# Patient Record
Sex: MALE | Race: WHITE | Employment: OTHER | URBAN - METROPOLITAN AREA
[De-identification: names, ages, dates, MRNs, and addresses within clinical notes are randomized per-mention and may not be internally consistent; named-entity substitution may affect disease eponyms.]

---

## 2018-11-27 ENCOUNTER — HOSPITAL ENCOUNTER (OUTPATIENT)
Dept: CARDIAC CATH/INVASIVE PROCEDURES | Age: 73
Discharge: HOME | End: 2018-11-27
Attending: SPECIALIST | Admitting: SPECIALIST
Payer: MEDICARE

## 2018-11-27 VITALS
WEIGHT: 302.69 LBS | DIASTOLIC BLOOD PRESSURE: 74 MMHG | SYSTOLIC BLOOD PRESSURE: 120 MMHG | OXYGEN SATURATION: 94 % | TEMPERATURE: 97.9 F | RESPIRATION RATE: 22 BRPM | BODY MASS INDEX: 37.64 KG/M2 | HEART RATE: 92 BPM | HEIGHT: 75 IN

## 2018-11-27 PROCEDURE — 74011000250 HC RX REV CODE- 250

## 2018-11-27 PROCEDURE — 92960 CARDIOVERSION ELECTRIC EXT: CPT

## 2018-11-27 PROCEDURE — 77030018729 HC ELECTRD DEFIB PAD CARD -B

## 2018-11-27 PROCEDURE — 74011250636 HC RX REV CODE- 250/636: Performed by: SPECIALIST

## 2018-11-27 RX ORDER — SODIUM CHLORIDE 9 MG/ML
75 INJECTION, SOLUTION INTRAVENOUS CONTINUOUS
Status: DISCONTINUED | OUTPATIENT
Start: 2018-11-27 | End: 2018-11-27 | Stop reason: HOSPADM

## 2018-11-27 RX ORDER — TAMSULOSIN HYDROCHLORIDE 0.4 MG/1
0.4 CAPSULE ORAL DAILY
COMMUNITY

## 2018-11-27 RX ORDER — CYCLOBENZAPRINE HCL 10 MG
10 TABLET ORAL
COMMUNITY
End: 2022-10-24

## 2018-11-27 RX ORDER — GABAPENTIN 300 MG/1
300 CAPSULE ORAL 3 TIMES DAILY
COMMUNITY
End: 2022-10-24

## 2018-11-27 RX ORDER — DILTIAZEM HYDROCHLORIDE 240 MG/1
240 CAPSULE, COATED, EXTENDED RELEASE ORAL DAILY
COMMUNITY
End: 2022-10-24

## 2018-11-27 RX ADMIN — SODIUM CHLORIDE 75 ML/HR: 900 INJECTION, SOLUTION INTRAVENOUS at 11:32

## 2018-11-27 RX ADMIN — METHOHEXITAL SODIUM 82.38 MG: 500 INJECTION, POWDER, LYOPHILIZED, FOR SOLUTION INTRAMUSCULAR; INTRAVENOUS; RECTAL at 13:54

## 2018-11-27 NOTE — DISCHARGE INSTRUCTIONS
Electrical Cardioversion: What to Expect at Home  Your Recovery    Electrical cardioversion is a treatment for an abnormal heartbeat, such as atrial fibrillation, supraventricular tachycardia, or ventricular tachycardia (VT). It uses a brief electrical shock to reset your heart's rhythm. After cardioversion, you may have redness, like a sunburn, where the patches were. The medicines you got to make you sleepy may make you feel drowsy for the rest of the day. Your doctor may have you take medicines to help the heart beat normally and to prevent blood clots. This care sheet gives you a general idea about how long it will take for you to recover. But each person recovers at a different pace. Follow the steps below to feel better as quickly as possible. How can you care for yourself at home? Medicines    · Be safe with medicines. Take your medicines exactly as prescribed. Call your doctor if you think you are having a problem with your medicine. You may take one or more of the following medicines:  ? Rate-control medicines to slow the heart rate. These include beta-blockers, calcium channel blockers, and digoxin. ? Rhythm control medicines that help the heart keep a normal rhythm. ? Blood thinners, also called anticoagulants, which help prevent blood clots. You will get more details on the specific medicines your doctor prescribes. Be sure you know how to take your medicines safely.     · Do not take any vitamins, over-the-counter medicines, or herbal products without talking to your doctor first.   Exercise    · Start light exercise if your doctor says that it is okay. Even a small amount will help you get stronger, have more energy, and manage your stress. Walking is an easy way to get exercise. Start out by walking a little more than you did in the hospital. Bit by bit, increase the amount you walk.     · When you exercise, watch for signs that your heart is working too hard.  You are pushing too hard if you cannot talk while you are exercising. If you become short of breath or dizzy or have chest pain, sit down and rest right away.     · Check your pulse regularly. Place two fingers on the artery at the palm side of your wrist in line with your thumb. If your heartbeat seems uneven or fast, talk to your doctor. Other instructions    · Ask your doctor when you can drive again.     · Do not smoke. If you need help quitting, talk to your doctor about stop-smoking programs and medicines. These can increase your chances of quitting for good.     · Limit alcohol. Follow-up care is a key part of your treatment and safety. Be sure to make and go to all appointments, and call your doctor if you are having problems. It's also a good idea to know your test results and keep a list of the medicines you take. When should you call for help? Call 911 anytime you think you may need emergency care. For example, call if:    · You passed out (lost consciousness).     · You have chest pain or pressure. This may occur with:  ? Sweating. ? Shortness of breath. ? Nausea or vomiting. ? Pain that spreads from the chest to the neck, jaw, or one or both shoulders or arms. ? A fast or uneven pulse. After calling 911, the  may tell you to chew 1 adult-strength or 2 to 4 low-dose aspirin. Wait for an ambulance. Do not try to drive yourself.     · You have symptoms of a stroke. These may include:  ? Sudden numbness, tingling, weakness, or loss of movement in your face, arm, or leg, especially on only one side of your body. ? Sudden vision changes. ? Sudden trouble speaking. ? Sudden confusion or trouble understanding simple statements. ? Sudden problems with walking or balance.   ? A sudden, severe headache that is different from past headaches.    Call your doctor now or seek immediate medical care if:    · You feel dizzy or lightheaded, or you feel like you may faint.     · You have a fast or irregular heartbeat.    Watch closely for any changes in your health, and be sure to contact your doctor if you have any problems. Where can you learn more? Go to http://prabha-bernard.info/. Enter A617 in the search box to learn more about \"Electrical Cardioversion: What to Expect at Home. \"  Current as of: December 6, 2017  Content Version: 11.8  © 0349-9428 [employer]. Care instructions adapted under license by [employer] (which disclaims liability or warranty for this information). If you have questions about a medical condition or this instruction, always ask your healthcare professional. [place] [address] any warranty or liability for your use of this information. Discharge Instructions:     Medications: Activity:     As tolerated. Diet:     American Heart Association. Follow-up:     Follow up with Sheryle Gitelman, MD on Decemebr 4th, 2018 at 9:30am.  [facility]  [facility]  [phone]      If you smoke, STOP!

## 2018-11-27 NOTE — PROGRESS NOTES
Patient arrived. ID and allergies verified verbally with patient. Pt voices understanding of procedure to be performed. Consent obtained. Pt prepped for procedure. 1:49 PM  TRANSFER - OUT REPORT:    Verbal report given to Kathy RN(name) on Nona Kamara  being transferred to Cath lab(unit) for ordered procedure       Report consisted of patients Situation, Background, Assessment and   Recommendations(SBAR). Information from the following report(s) SBAR was reviewed with the receiving nurse. Lines:   Peripheral IV 11/27/18 Anterior;Right Arm (Active)        Opportunity for questions and clarification was provided. Patient transported with:   Registered Nurse    2:19 PM  TRANSFER - IN REPORT:    Verbal report received from Omer Hidalgo RN(name) on Nona Kamara  being received from Cath Lab(unit) for routine post - op      Report consisted of patients Situation, Background, Assessment and   Recommendations(SBAR). Information from the following report(s) SBAR, Procedure Summary and Cardiac Rhythm Afib was reviewed with the receiving nurse. Opportunity for questions and clarification was provided. Assessment completed upon patients arrival to unit and care assumed. 2:59 PM  Family at bedside. Discharge instructions reviewed with patient and family. Voiced understanding. Patient given copy of discharge instructions to take home. 3:16 PM  Pt discharged via wheelchair with patient tech. Discharged with family. Personal belongings with patient upon discharge. No complaints.

## 2018-11-27 NOTE — H&P
Date of Surgery Update:  Sophie Barreto was seen and examined. History and physical has been reviewed. The patient has been examined. There have been no significant clinical changes since the completion of the originally dated History and Physical.    Signed By: Evy Terrazas MD     November 27, 2018 12:12 PM         Petra Castle 1945   Office/Outpatient Visit  Visit Date: Mon, Oct 29, 2018 10:20 am  Provider: Evy Terrazas MD (Assistant: Chad Cruz, [place])  Location: Cardiology of [facility] AT [address] [place]. [address] [phone]    Electronically signed by Evy Terrazas MD on  10/29/2018 11:27:58 AM                           SUBJECTIVE:    CC:   Mr. Chaparrita Lazo is a 68year old White male. He does not have a primary care physician. This is a new to our office/seen in hospital follow-up visit. He is here today following a transition of care from the inpatient hospital setting. He was admitted to [facility] on 10/19/2018 and discharged on 10/20/2018 admitted with PE and a-fin with RVR. Medication bottles reviewed. The primary reason for today's visit is evaluation of the following: atrial fibrillation and pulmonary embolism. HPI:     Current related medications include a calcium channel blocker for rate control and Eliquis. He is compliant with his medication regimen. Current level of activity includes ability to walk. He has not been aware of any disturbance in heart rhythm; he specifically denies an irregular heartbeat, brief episodes of tachycardia, prolonged episodes of tachycardia, chest pain, shortness of breath, orthopnea and pedal edema. A transthoracic ECHO has been done ( performed 10/20/2018 ). Pulmonary embolism and infarction, other noted. ROS:   Discussed relevant lab and imaging studies along with the plan of treatment with the nurse. EYES:  Negative for blurred vision and eye pain.     E/N/T:  Negative for epistaxis and hoarseness. CARDIOVASCULAR:  Negative for chest pain, orthopnea, palpitations and pedal edema. RESPIRATORY:  Negative for cough and hemoptysis. GASTROINTESTINAL:  Negative for abdominal pain and dysphagia. MUSCULOSKELETAL:  Negative for arthralgias and back pain. NEUROLOGICAL:  Negative for headaches, paresthesias, and weakness. HEMATOLOGIC/LYMPHATIC:  Negative for easy bruising, bleeding, and lymphadenopathy. PSYCHIATRIC:  No symptoms reported. PMH/FMH/SH:   Last Reviewed on 10/26/2018 12:04 PM by Cynthia Frankel LPN  Past Medical History:     Atrial Fibrillation   Pulmonary Embolism   INFLUENZA VACCINE: was last done    PNEUMOCOCCAL VACCINE: was last done      Past Cardiac Procedures/Tests:  TESTING ( Dr Jessie Morales)     1. ATRIAL FIBRILLATION  a. Echo (10/20/18): EF 55-60%. Dil LA. 2. PULMONARY EMBOLISM   a. CTA (10/19/18): Mild embolic burden. ADVANCED DIRECTIVES: None     Surgical History:   Surgical/Procedural History:   Joint Replacement:  Hip Replacement: bilateral;   Knee Surgery; x2 on each knee     Family History:   Father:  at age 80  ; Positve for Natural causes; Mother: Living  ; Positive for Dementia; Social History:   Social History:   Occupation: Retired   Marital Status:    Children: 1 child and 2 step-children     Tobacco/Alcohol/Supplements:   Last Reviewed on 10/26/2018 12:04 PM by Sanjuana Frankel LPN  TOBACCO/ALCOHOL/SUPPLEMENTS   Tobacco: Past history of cigarette smoking, but has quit. Alcohol: Drinks alcohol very infrequently. Caffeine:  He admits to consuming caffeine via coffee ( 2-3 servings per day ) and tea. Substance Abuse History:   Last Reviewed on 10/26/2018 12:04 PM by Sanjuana Frankel LPN  Substance Use/Abuse:   None     Mental Health History:   Last Reviewed on 10/26/2018 12:04 PM by Pau Palacios LPN, [address] (eg STDs):    Last Reviewed on 10/26/2018 12:04 PM by Sanjuana Frankel LPN      Current Problems:   Last Reviewed on 10/26/2018 12:04 PM by Sanjuana De La Torre LPN  Atrial fibrillation   Pulmonary embolism and infarction, other     Allergies:   Last Reviewed on 10/26/2018 12:04 PM by Sanjuana De La Torre LPN    No Known Drug Allergies. Current Medications:   Last Reviewed on 10/29/2018 10:59 AM by Madison Lynch CD  240mg Capsules, Extended Release 1 po qd   Eliquis 5mg Tablet 2 po bid   Flomax 0.4mg Capsules 1 po qd   Gabapentin 300mg Tablet as directed   Tramadol 50mg Tablet prn as directed     OBJECTIVE:    Vitals:     Current: 10/29/2018 11:00:09 AM  Ht:  6 ft, 3 in; Wt: 296 lbs;  BMI: 37.0  BP: 108/79 mm Hg (left arm, sitting);  P: 71 bpm;  sCr: 0.86 mg/dL;  GFR: 98.27    Exams:   GENERAL:  Alert, oriented to person, place and time x3. EYES:  Normal lids without xanthelasma; conjunctiva unremarkable; no scleral icterus. HEENT:  Face symmetric, voice clear, extraocular muscles intact. NECK:  Supple. No bruits, No JVD. LUNGS:  Clear to auscultation. No rales, no wheezing. CARDIAC: S1 and S2 normal. No murmur , gallops or rubs. No S3 or S4. Irregular rhythm. ABDOMEN:  Soft. Positive bowel sounds. Non-tender. MUSCULOSKELETAL:  Normal range of motion, strength and tone. EXTREMITIES:  No edema. Good muscle tone and strength. SKIN: No significant rashes or lesions; no suspicious moles. NEUROLOGICAL:  No focal deficits, cranial nerves II-XII are grossly intact. PSYCHIATRIC:  Appropriate affect and demeanor; normal speech pattern; grossly normal memory. Lab/Test Results:       Sodium, Serum:  140 (10/20/2018),   Potassium, Serum:  3.7 (10/20/2018),   Glucose Serum:  118 (10/20/2018),   BUN serum/plasma ([facility]):  23 (10/20/2018),   Creatinine, Serum:  0.86 (10/20/2018),   WBC count:  7.97 (10/20/2018),   Hgb:  14.6 (10/20/2018),   Hct:  44.3 (10/20/2018),   Platelets:  919 (71/97/6573),       Procedures:   Atrial fibrillation     ECG INTERPRETATION: See scanned EKG for results.     AF @ 102       ASSESSMENT     Doing well from a cardiac standpoint. Lengthy discussion regarding AF, CVA prophylaxis, rhythm control, rate-control, antiarrhythmics, ablation. DCCV in 3 week. Continuing eliquis for 6 months total for PE. Discussed diet/exercise. 427.31   I48.91  Atrial fibrillation            DDx:   415.19   I26.99  Pulmonary embolism and infarction, other            DDx:     ORDERS:     Procedures Ordered:     [id number]  Electrocardiogram, routine with at least 12 leads; with interpretation and report  (In-House)       Other Orders:     [id number]  [Not available]  (Send-Out)       FLORESITA  CHADS  (Send-Out)       [id number]  Queried Patient for Tobacco Use  (Send-Out)           PLAN:      Atrial fibrillation     *  Plan of care for atrial fibrillation: Follow up visit [id number] score remains greater than 1. He is tolerating rate and rhythm control with prescribed medications. The patient remains anticoagulated with Eliquis. Patient continues to tolerate anticoagulation therapy. .      Medication list has been reviewed. Continue current medications. Smoking Status:  Nonsmoker     Testing/Procedures:  Cardioversion - to be done in one month at [address] [place] [address] to call immediately if he develops new or worsening symptoms, such as shortness of breath and chest pain. Schedule follow-up appointments on a p.r.n. basis.  Schedule a follow-up appointment after testing is complete

## 2018-11-27 NOTE — PROCEDURES
Indication:  Atrial fibrillation. Procedure:  Informed consent was obtained. The patient was anesthetized with 0.6mg/kg brevital.  The patient was converted from atrial fibrillation to normal sinus rhythm with a single biphasic shock of 360 joules. The patient tolerated the procedure well without obvious complications and was transferred to the holding room in stable condition. Summary:  Successful DCCV of atrial fibrillation to NSR, though this quickly reverted back to AF    F/U with me on 12/4/18.

## 2020-03-24 ENCOUNTER — HOSPITAL ENCOUNTER (OUTPATIENT)
Dept: GENERAL RADIOLOGY | Age: 75
Discharge: HOME | End: 2020-03-24
Attending: OTOLARYNGOLOGY
Payer: MEDICARE

## 2020-03-24 DIAGNOSIS — Z00.8 OTHER SPECIFIED GENERAL MEDICAL EXAMINATION: ICD-10-CM

## 2020-03-24 DIAGNOSIS — R13.12 OROPHARYNGEAL DYSPHAGIA: ICD-10-CM

## 2020-03-24 PROCEDURE — 74230 X-RAY XM SWLNG FUNCJ C+: CPT

## 2020-03-24 PROCEDURE — 92611 MOTION FLUOROSCOPY/SWALLOW: CPT

## 2020-03-24 NOTE — PROGRESS NOTES
[address], [address] STUDY  Patient: Khadra Cohen (30 y.o. male)  Date: 3/24/2020  Referring Provider:  Messi MCKEON    SUBJECTIVE:   Patient c/o dysphagia since ACDF in Nov 2019. [place] He has constant globus sensation. He c/o regurgiation of pills at times. Increased voice raspiness. NO PNA. OBJECTIVE:   Past Medical History:   No past medical history on file. No past surgical history on file. Current Dietary Status:  Regular, thins  Radiologist: Rosalia  Film Views: Lateral  Patient Position: upright  standing    Trial 1: Trial 2:   Consistency Presented: Thin liquid;Puree;Pill/Tablet; Solid     How Presented: Spoon;Self-fed/presented;Straw      ORAL PHASE:      Bolus Acceptance: No impairment     Bolus Formation/Control: No impairment:    :     Propulsion: No impairment     Oral Residue: None   PHARYNGEAL PHASE:      Initiation of Swallow: No impairment     Timing: No impairment     Penetration: None     Aspiration/Timing: No evidence of aspiration     Pharyngeal Clearance: Vallecular residue; Less than 10%(with solids and purees)     Attempted Modifications: Alternate liquids/solids     Effective Modifications: Alternate liquids/solids(usually reduced to minimal amounts)     Cues for Modifications: None             Trial 3: Trial 4:    ESOPHAGEAL PHASE:   Radiologist scrolled throught [facility]. Pill was stuck in lower esophagus for a few minutes. It eventually cleared with massive amounts of thins. Noted to and fro in lower esophagus.                          :    :                                                                        Decreased Tongue Base Retraction?: Yes  Laryngeal Elevation: Reduced excursion with laryngeal vestibule gap(mild)  Aspiration/Penetration Score: 1 (No penetration or aspiration-Contrast does not enter the airway)                     ASSESSMENT :  Based on the objective data described above, the patient presents with mild pharyngeal dysphagia with mild upper pharyngeal residue due to BOT retraction weakness and mild epiglottal inversion. Unable to r/o reflux with some to and fro noted in esophagus  With small amounts of liquids, c/o globus sensation. Provided pharyngeal strengthening and BOT retraction exercises (written handout)  . PLAN/RECOMMENDATIONS :  Continue diet as tolerated. Practice swallowing exercises. Watch tongue for white coating that may indicate candidiasis and indicate need for tx. Talk to MD about reflux tx. COMMUNICATION/EDUCATION:   The above findings and recommendations were discussed with: patient who verbalized understanding.     Thank you for this referral.  Melonie Leahy, SLP  Time Calculation: 20 mins

## 2022-10-23 PROBLEM — Z79.01 ANTICOAGULATION ADEQUATE: Status: ACTIVE | Noted: 2022-10-23

## 2022-10-24 ENCOUNTER — OFFICE VISIT (OUTPATIENT)
Dept: CARDIOLOGY CLINIC | Age: 77
End: 2022-10-24
Payer: MEDICARE

## 2022-10-24 VITALS
WEIGHT: 303.2 LBS | RESPIRATION RATE: 16 BRPM | BODY MASS INDEX: 37.7 KG/M2 | DIASTOLIC BLOOD PRESSURE: 82 MMHG | HEIGHT: 75 IN | SYSTOLIC BLOOD PRESSURE: 128 MMHG | OXYGEN SATURATION: 94 % | HEART RATE: 72 BPM

## 2022-10-24 DIAGNOSIS — I48.11 LONGSTANDING PERSISTENT ATRIAL FIBRILLATION (HCC): Primary | ICD-10-CM

## 2022-10-24 DIAGNOSIS — I11.0: ICD-10-CM

## 2022-10-24 DIAGNOSIS — Z79.01 ANTICOAGULATION ADEQUATE: ICD-10-CM

## 2022-10-24 DIAGNOSIS — G47.33 OSA ON CPAP: ICD-10-CM

## 2022-10-24 DIAGNOSIS — Z99.89 OSA ON CPAP: ICD-10-CM

## 2022-10-24 DIAGNOSIS — E66.01 SEVERE OBESITY (BMI 35.0-35.9 WITH COMORBIDITY) (HCC): ICD-10-CM

## 2022-10-24 PROCEDURE — 99204 OFFICE O/P NEW MOD 45 MIN: CPT | Performed by: INTERNAL MEDICINE

## 2022-10-24 PROCEDURE — 93010 ELECTROCARDIOGRAM REPORT: CPT | Performed by: INTERNAL MEDICINE

## 2022-10-24 PROCEDURE — 93005 ELECTROCARDIOGRAM TRACING: CPT | Performed by: INTERNAL MEDICINE

## 2022-10-24 PROCEDURE — 1123F ACP DISCUSS/DSCN MKR DOCD: CPT | Performed by: INTERNAL MEDICINE

## 2022-10-24 PROCEDURE — G0463 HOSPITAL OUTPT CLINIC VISIT: HCPCS | Performed by: INTERNAL MEDICINE

## 2022-10-24 RX ORDER — METOPROLOL SUCCINATE 50 MG/1
50 TABLET, EXTENDED RELEASE ORAL DAILY
COMMUNITY
Start: 2022-10-06

## 2022-10-24 RX ORDER — FINASTERIDE 5 MG/1
TABLET, FILM COATED ORAL
COMMUNITY
Start: 2022-10-09

## 2022-10-24 RX ORDER — ATORVASTATIN CALCIUM 20 MG/1
20 TABLET, FILM COATED ORAL DAILY
COMMUNITY
Start: 2022-09-05

## 2022-10-24 RX ORDER — VITAMIN E 1000 UNIT
1000 CAPSULE ORAL DAILY
COMMUNITY

## 2022-10-24 NOTE — PROGRESS NOTES
Room EP 3    Chief Complaint   Patient presents with    Irregular Heart Beat       Visit Vitals  /82 (BP 1 Location: Left upper arm, BP Patient Position: Sitting, BP Cuff Size: Large adult)   Pulse 72   Resp 16   Ht 6' 3\" (1.905 m)   Wt 303 lb 3.2 oz (137.5 kg)   SpO2 94%   BMI 37.90 kg/m²       EKG done today    Chest pain: no    Shortness of breath: no    Edema:left lower leg, wears compression hose    Palpitations, Skipped beats, Rapid heartbeat: no    Dizziness: no    Fatigue:no    New diagnosis/Surgeries: no    1. Have you been to the ER, urgent care clinic since your last visit? Hospitalized since your last visit? No      2. Have you seen or consulted any other health care providers outside of the [address] [place] [address] since your last visit? Include any pap smears or colon screening.  [facility], records available    Refills:no

## 2022-10-24 NOTE — PROGRESS NOTES
Cardiac Electrophysiology OFFICE Consultation Note       Assessment/Plan:   1. Longstanding persistent atrial fibrillation (HCC)  -     AMB POC EKG ROUTINE W/ 12 LEADS, INTER & REP  2. Anticoagulation adequate  3. Severe obesity (BMI 35.0-35.9 with comorbidity) ([address]  4. ISADORA on CPAP  5. 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 Status post PVI, CTI, posterior wall isolation ablation 11/22/2021. Has done well status post ablation without any recurrent atrial fibrillation or palpitations. Remains in sinus rhythm today on EKG.  - I'm pleased to hear how well the patient has done post ablation. We spoke in great detail regarding the importance of multidisciplinary management of AFib and the role of risk factors modification in preventing recurrent AF including focusing on diet, weight loss, control of blood pressure, glycemic control, ISADORA diagnosis and treatment, and medication compliance. -CPAP compliance  -Weight loss goal ~30 lbs  -BP goal <140/90 mmHg  -Obtain 1 week event monitor in 1 month  -Continue metoprolol for management of blood pressure  - Continue Eliquis for thromboembolic prophylaxis  -FU 3 months with NP  -FU 6 months with Dr. Diaz Smoker    Anticoagulation  Denies of any bleeding issues. Know to contact clinic with any bleeding side effects (BRBPR, melena, hemotysis, hematuria). Severe obesity  I have discussed with the patient the need to exercise and lose weight. Encouraged increased physical activity as able and reduced caloric intake. ISADORA  History of ISADORA on CPAP. We spoke regarding the associated between ISADORA and its role in AFib pathophysiology. I emphasized the importance of ISADORA diagnosis and the need CPAP compliance.     Hypertension with heart failure preserved ejection fraction  Volume status euvolemic  - Blood pressure well controlled continue metoprolol    Subjective:       Nilda Diallo is a 69 y.o. patient who is seen for evaluation of atrial fibrillation. He underwent A. fib ablation in November. Has done well despite he was previously in longstanding persistent atrial fibrillation. Remains in normal sinus rhythm. Has not had any recurrent hospitalization. No palpitations, shortness of breath or chest pain. 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 atorvastatin (LIPITOR) 20 mg tablet Take 20 mg by mouth daily. ascorbic acid, vitamin C, (Vitamin C) 1,000 mg tablet Take 1,000 mg by mouth daily. rutin/hesp/bioflav/C/hcdayy006 (BIOFLEX PO) Take 1 Tablet by mouth two (2) times a day. tamsulosin (FLOMAX) 0.4 mg capsule Take 0.4 mg by mouth daily. apixaban (ELIQUIS) 5 mg tablet Take 5 mg by mouth two (2) times a day. No Known Allergies  History reviewed. No pertinent past medical history. History reviewed. No pertinent surgical history. History reviewed. No pertinent family history. Social History     Tobacco Use    Smoking status: Former     Packs/day: 0.50     Years: 4.00     Pack years: 2.00     Types: Cigarettes     Quit date:      Years since quittin.8    Smokeless tobacco: Current    Tobacco comments:     Dips snuff   Substance Use Topics    Alcohol use: Yes     Comment: rarely        Review of Systems:   12 point review of systems was performed.  All negative except for HPI     Objective: /82 (BP 1 Location: Left upper arm, BP Patient Position: Sitting, BP Cuff Size: Large adult)   Pulse 72   Resp 16   Ht 6' 3\" (1.905 m)   Wt 303 lb 3.2 oz (137.5 kg)   SpO2 94%   BMI 37.90 kg/m²     Physical Exam:   General:  Alert and oriented, in no acute distress  Head:  Atraumatic, normocephalic  Eyes:  extraocular muscles intact  Neck:  Supple, normal range of motion  Lungs:  Clear to auscultation bilaterally, no wheezes/rales/rhonchi   Cardiovascular:  Regular rate and rhythm, normal S1-S2, no murmurs/rubs/gallops  Abdomen:  Soft, nontender, nondistended, normoactive bowel sounds  Skin:  Intact, no rash  Extremities:, no clubbing, cyanosis, or edema  Musculoskeletal: normal range of motion  Neurological:  Alert and oriented, no focal neurologic deficits  Psychiatric:  Normal mood and affect    No results found for: HBA1C, ZNW7SFAI, TPE7BLND  EKG: Sinus rhythm heart rate in the 60s        No results found for this or any previous visit. Thank you for involving me in this patient's care and please call with further concerns or questions. ________________________________________  An Muriel Lundy MD, [facility], [facility]  Cardiac Electrophysiology  [facility] [place]  [address] [place] [address]                             [phone]     [address] [place] [address].  [address] [place] [address]  [phone]

## 2022-10-24 NOTE — PATIENT INSTRUCTIONS
It's great to see you remain in sinus rhythm  CPAP compliance  Weight loss goal ~30 lbs  BP goal <140/90 mmHg  Obtain 1 week event monitor in 1 month  FU 3 months with NP  FU 6 months with Dr. Arti Guevara

## 2022-12-02 ENCOUNTER — TELEPHONE (OUTPATIENT)
Dept: CARDIOLOGY CLINIC | Age: 77
End: 2022-12-02

## 2022-12-02 NOTE — TELEPHONE ENCOUNTER
Enrolled with [employer] - Ordered and being shipped to patient's home address on file. ETA within 5-7 business days.     Eli Adrian MD   Physician   Specialty:  Cardiovascular Disease Physician   Patient Instructions   Signed   Encounter Date:  10/24/2022                         It's great to see you remain in sinus rhythm  CPAP compliance  Weight loss goal ~30 lbs  BP goal <140/90 mmHg  Obtain 1 week event monitor in 1 month  FU 3 months with NP  FU 6 months with Dr. Kyle Pittman

## 2022-12-27 PROBLEM — Z86.79 S/P ABLATION OF ATRIAL FIBRILLATION: Status: ACTIVE | Noted: 2022-12-27

## 2022-12-27 PROBLEM — Z98.890 S/P ABLATION OF ATRIAL FIBRILLATION: Status: ACTIVE | Noted: 2022-12-27

## 2022-12-27 NOTE — PROGRESS NOTES
Cardiac Electrophysiology OFFICE Follow Up Note       Assessment/Plan:   1. Longstanding persistent atrial fibrillation ([address]  2. S/P ablation of atrial fibrillation  3. Anticoagulation adequate  4. ISADORA on CPAP     Longstanding persistent atrial fibrillation  Symptomatic with history of heart failure. Status post PVI, CTI, posterior wall isolation ablation 11/22/2021 with Dr. Sandip Gupta.   -Weight loss goal ~30 lbs  -BP goal <140/90 mmHg  - One week monitor December 2022 showed no AF  -Continue metoprolol for management of blood pressure  - Continue Eliquis for thromboembolic prophylaxis  -FU 3 months with Dr. Sandip Gupta    Anticoagulation  - Denies of any bleeding issues. Know to contact clinic with any bleeding side effects (BRBPR, melena, hemotysis, hematuria). - Continue Eliquis    Severe obesity  I have discussed with the patient the need to exercise and lose weight. Encouraged increased physical activity as able and reduced caloric intake. ISADORA  History of ISADORA on CPAP. We spoke regarding the associated between ISADORA and its role in AFib pathophysiology. I emphasized the importance of ISADORA diagnosis and the need CPAP compliance. Hypertension with heart failure preserved ejection fraction  Volume status euvolemic  - Blood pressure well controlled. continue metoprolol    Subjective:       Rina Dumotn is a 69 y.o. patient who is seen for follow up of atrial fibrillation. He underwent A. fib ablation in November 2021. Has done well despite he was previously in longstanding persistent atrial fibrillation. Remains in normal sinus rhythm. No AF on recent one week monitor. He denies chest pain, dyspnea, dizziness or syncope.      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 finasteride (PROSCAR) 5 mg tablet TAKE 1 TABLET BY MOUTH ONCE DAILY AT NIGHT      metoprolol succinate (TOPROL-XL) 50 mg XL tablet Take 50 mg by mouth daily. atorvastatin (LIPITOR) 20 mg tablet Take 20 mg by mouth daily. ascorbic acid, vitamin C, (VITAMIN C) 1,000 mg tablet Take 1,000 mg by mouth daily. rutin/hesp/bioflav/C/vvvqak046 (BIOFLEX PO) Take 1 Tablet by mouth two (2) times a day. tamsulosin (FLOMAX) 0.4 mg capsule Take 0.4 mg by mouth daily. apixaban (ELIQUIS) 5 mg tablet Take 5 mg by mouth two (2) times a day. No Known Allergies  History reviewed. No pertinent past medical history. History reviewed. No pertinent surgical history. History reviewed. No pertinent family history. Social History     Tobacco Use    Smoking status: Former     Packs/day: 0.50     Years: 4.00     Pack years: 2.00     Types: Cigarettes     Quit date: 1970     Years since quittin.1    Smokeless tobacco: Current    Tobacco comments:     Dips snuff   Substance Use Topics    Alcohol use: Yes     Comment: rarely        Review of Systems:   12 point review of systems was performed.  All negative except for HPI     Objective:   /68 (BP 1 Location: Left upper arm, BP Patient Position: Sitting, BP Cuff Size: Large adult)   Pulse 72   Resp 16   Ht 6' 3\" (1.905 m)   Wt 138.6 kg (305 lb 9.6 oz)   SpO2 96%   BMI 38.20 kg/m²     Physical Exam:   General:  Alert and oriented, in no acute distress; obese  Head:  Atraumatic, normocephalic  Eyes:  extraocular muscles intact  Neck:  Supple, normal range of motion  Lungs:  Clear to auscultation bilaterally, no wheezes/rales/rhonchi   Cardiovascular:  Regular rate and rhythm, normal S1-S2, no murmurs/rubs/gallops  Abdomen:  Soft, nontender, nondistended, normoactive bowel sounds  Skin:  Intact, no rash  Extremities:, no clubbing, cyanosis, or edema  Musculoskeletal: normal range of motion  Neurological:  Alert and oriented, no focal neurologic deficits  Psychiatric:  Normal mood and affect    No results found for: HBA1C, QQW0QMZA, MAP5MINI, PFR2XLLT  EKG 10/24/22: Sinus rhythm heart rate in the 60s        Thank you for involving me in this patient's care and please call with further concerns or questions. ________________________________________  Emily Erwin NP    Cardiac Electrophysiology  [facility]   [address] [facility].  [address] [facility], [address] [place] [address] [phone]

## 2023-02-03 ENCOUNTER — OFFICE VISIT (OUTPATIENT)
Dept: CARDIOLOGY CLINIC | Age: 78
End: 2023-02-03
Payer: MEDICARE

## 2023-02-03 VITALS
BODY MASS INDEX: 38 KG/M2 | SYSTOLIC BLOOD PRESSURE: 122 MMHG | OXYGEN SATURATION: 96 % | HEIGHT: 75 IN | DIASTOLIC BLOOD PRESSURE: 68 MMHG | RESPIRATION RATE: 16 BRPM | WEIGHT: 305.6 LBS | HEART RATE: 72 BPM

## 2023-02-03 DIAGNOSIS — Z79.01 ANTICOAGULATION ADEQUATE: ICD-10-CM

## 2023-02-03 DIAGNOSIS — Z98.890 S/P ABLATION OF ATRIAL FIBRILLATION: ICD-10-CM

## 2023-02-03 DIAGNOSIS — G47.33 OSA ON CPAP: ICD-10-CM

## 2023-02-03 DIAGNOSIS — Z99.89 OSA ON CPAP: ICD-10-CM

## 2023-02-03 DIAGNOSIS — I48.11 LONGSTANDING PERSISTENT ATRIAL FIBRILLATION (HCC): Primary | ICD-10-CM

## 2023-02-03 DIAGNOSIS — Z86.79 S/P ABLATION OF ATRIAL FIBRILLATION: ICD-10-CM

## 2023-02-03 RX ORDER — TADALAFIL 5 MG/1
5 TABLET ORAL
COMMUNITY

## 2023-02-03 NOTE — PROGRESS NOTES
Room EP 3    Chief Complaint   Patient presents with    Irregular Heart Beat     Visit Vitals  /68 (BP 1 Location: Left upper arm, BP Patient Position: Sitting, BP Cuff Size: Large adult)   Pulse 72   Resp 16   Ht 6' 3\" (1.905 m)   Wt 305 lb 9.6 oz (138.6 kg)   SpO2 96%   BMI 38.20 kg/m²         Chest pain: brief episodes while wearing the monitor    Shortness of breath: occasionally at rest and with activity    Edema: yes ,wears compression socks    Palpitations, Skipped beats, Rapid heartbeat: no    Dizziness: occasional, randomly while cooking    Fatigue:no    New diagnosis/Surgeries: no    1. Have you been to the ER, urgent care clinic since your last visit? Hospitalized since your last visit? NO      2. Have you seen or consulted any other health care providers outside of the [address] [place] [address] since your last visit? Include any pap smears or colon screening.  Labs with PCP    Refills:NO

## 2023-02-07 ENCOUNTER — HOSPITAL ENCOUNTER (OUTPATIENT)
Age: 78
Setting detail: OBSERVATION
Discharge: HOME | End: 2023-02-08
Attending: EMERGENCY MEDICINE | Admitting: HOSPITALIST
Payer: MEDICARE

## 2023-02-07 ENCOUNTER — APPOINTMENT (OUTPATIENT)
Dept: MRI IMAGING | Age: 78
End: 2023-02-07
Attending: HOSPITALIST
Payer: MEDICARE

## 2023-02-07 ENCOUNTER — APPOINTMENT (OUTPATIENT)
Dept: CT IMAGING | Age: 78
End: 2023-02-07
Attending: EMERGENCY MEDICINE
Payer: MEDICARE

## 2023-02-07 DIAGNOSIS — R27.0 ATAXIA: Primary | ICD-10-CM

## 2023-02-07 DIAGNOSIS — R42 DIZZINESS: ICD-10-CM

## 2023-02-07 PROBLEM — M54.30 SCIATICA: Status: ACTIVE | Noted: 2018-11-15

## 2023-02-07 PROBLEM — N40.0 BPH (BENIGN PROSTATIC HYPERPLASIA): Status: ACTIVE | Noted: 2023-02-07

## 2023-02-07 PROBLEM — M51.36 LUMBAR DEGENERATIVE DISC DISEASE: Status: ACTIVE | Noted: 2018-05-09

## 2023-02-07 PROBLEM — G45.9 TIA (TRANSIENT ISCHEMIC ATTACK): Status: ACTIVE | Noted: 2023-02-07

## 2023-02-07 PROBLEM — M51.369 LUMBAR DEGENERATIVE DISC DISEASE: Status: ACTIVE | Noted: 2018-05-09

## 2023-02-07 PROBLEM — I48.91 ATRIAL FIBRILLATION (HCC): Status: ACTIVE | Noted: 2022-02-16

## 2023-02-07 LAB
ALBUMIN SERPL-MCNC: 3.8 G/DL (ref 3.5–5)
ALBUMIN/GLOB SERPL: 1 (ref 1.1–2.2)
ALP SERPL-CCNC: 73 U/L (ref 45–117)
ALT SERPL-CCNC: 64 U/L (ref 12–78)
ANION GAP SERPL CALC-SCNC: 10 MMOL/L (ref 5–15)
AST SERPL-CCNC: 44 U/L (ref 15–37)
BASOPHILS # BLD: 0 K/UL (ref 0–0.1)
BASOPHILS NFR BLD: 0 % (ref 0–1)
BILIRUB SERPL-MCNC: 0.6 MG/DL (ref 0.2–1)
BUN SERPL-MCNC: 24 MG/DL (ref 6–20)
BUN/CREAT SERPL: 24 (ref 12–20)
CALCIUM SERPL-MCNC: 9.3 MG/DL (ref 8.5–10.1)
CHLORIDE SERPL-SCNC: 108 MMOL/L (ref 97–108)
CO2 SERPL-SCNC: 23 MMOL/L (ref 21–32)
COMMENT, HOLDF: NORMAL
CREAT SERPL-MCNC: 1.01 MG/DL (ref 0.7–1.3)
DIFFERENTIAL METHOD BLD: NORMAL
EOSINOPHIL # BLD: 0 K/UL (ref 0–0.4)
EOSINOPHIL NFR BLD: 0 % (ref 0–7)
ERYTHROCYTE [DISTWIDTH] IN BLOOD BY AUTOMATED COUNT: 12.6 % (ref 11.5–14.5)
GLOBULIN SER CALC-MCNC: 3.8 G/DL (ref 2–4)
GLUCOSE BLD STRIP.AUTO-MCNC: 144 MG/DL (ref 65–117)
GLUCOSE SERPL-MCNC: 167 MG/DL (ref 65–100)
HCT VFR BLD AUTO: 44.2 % (ref 36.6–50.3)
HGB BLD-MCNC: 14.3 G/DL (ref 12.1–17)
IMM GRANULOCYTES # BLD AUTO: 0 K/UL (ref 0–0.04)
IMM GRANULOCYTES NFR BLD AUTO: 0 % (ref 0–0.5)
INR PPP: 1.1 (ref 0.9–1.1)
LYMPHOCYTES # BLD: 1.4 K/UL (ref 0.8–3.5)
LYMPHOCYTES NFR BLD: 19 % (ref 12–49)
MCH RBC QN AUTO: 29.2 PG (ref 26–34)
MCHC RBC AUTO-ENTMCNC: 32.4 G/DL (ref 30–36.5)
MCV RBC AUTO: 90.2 FL (ref 80–99)
MONOCYTES # BLD: 0.5 K/UL (ref 0–1)
MONOCYTES NFR BLD: 6 % (ref 5–13)
NEUTS SEG # BLD: 5.6 K/UL (ref 1.8–8)
NEUTS SEG NFR BLD: 75 % (ref 32–75)
NRBC # BLD: 0 K/UL (ref 0–0.01)
NRBC BLD-RTO: 0 PER 100 WBC
PLATELET # BLD AUTO: 229 K/UL (ref 150–400)
PMV BLD AUTO: 10.1 FL (ref 8.9–12.9)
POTASSIUM SERPL-SCNC: 3.4 MMOL/L (ref 3.5–5.1)
PROT SERPL-MCNC: 7.6 G/DL (ref 6.4–8.2)
PROTHROMBIN TIME: 11.2 SEC (ref 9–11.1)
RBC # BLD AUTO: 4.9 M/UL (ref 4.1–5.7)
SAMPLES BEING HELD,HOLD: NORMAL
SERVICE CMNT-IMP: ABNORMAL
SODIUM SERPL-SCNC: 141 MMOL/L (ref 136–145)
WBC # BLD AUTO: 7.6 K/UL (ref 4.1–11.1)

## 2023-02-07 PROCEDURE — 96374 THER/PROPH/DIAG INJ IV PUSH: CPT

## 2023-02-07 PROCEDURE — 70551 MRI BRAIN STEM W/O DYE: CPT

## 2023-02-07 PROCEDURE — 83036 HEMOGLOBIN GLYCOSYLATED A1C: CPT

## 2023-02-07 PROCEDURE — 0042T CT CODE NEURO PERF W CBF: CPT

## 2023-02-07 PROCEDURE — 99285 EMERGENCY DEPT VISIT HI MDM: CPT

## 2023-02-07 PROCEDURE — 93005 ELECTROCARDIOGRAM TRACING: CPT

## 2023-02-07 PROCEDURE — G0378 HOSPITAL OBSERVATION PER HR: HCPCS

## 2023-02-07 PROCEDURE — 96375 TX/PRO/DX INJ NEW DRUG ADDON: CPT

## 2023-02-07 PROCEDURE — 70496 CT ANGIOGRAPHY HEAD: CPT

## 2023-02-07 PROCEDURE — 85610 PROTHROMBIN TIME: CPT

## 2023-02-07 PROCEDURE — 74011250636 HC RX REV CODE- 250/636: Performed by: STUDENT IN AN ORGANIZED HEALTH CARE EDUCATION/TRAINING PROGRAM

## 2023-02-07 PROCEDURE — 80053 COMPREHEN METABOLIC PANEL: CPT

## 2023-02-07 PROCEDURE — 85025 COMPLETE CBC W/AUTO DIFF WBC: CPT

## 2023-02-07 PROCEDURE — 82962 GLUCOSE BLOOD TEST: CPT

## 2023-02-07 PROCEDURE — 74011250637 HC RX REV CODE- 250/637: Performed by: HOSPITALIST

## 2023-02-07 PROCEDURE — 77010033678 HC OXYGEN DAILY

## 2023-02-07 PROCEDURE — 70450 CT HEAD/BRAIN W/O DYE: CPT

## 2023-02-07 PROCEDURE — 74011250636 HC RX REV CODE- 250/636: Performed by: EMERGENCY MEDICINE

## 2023-02-07 PROCEDURE — 36415 COLL VENOUS BLD VENIPUNCTURE: CPT

## 2023-02-07 PROCEDURE — 94762 N-INVAS EAR/PLS OXIMTRY CONT: CPT

## 2023-02-07 PROCEDURE — 74011000636 HC RX REV CODE- 636: Performed by: EMERGENCY MEDICINE

## 2023-02-07 PROCEDURE — 94761 N-INVAS EAR/PLS OXIMETRY MLT: CPT

## 2023-02-07 RX ORDER — ATORVASTATIN CALCIUM 20 MG/1
20 TABLET, FILM COATED ORAL DAILY
Status: DISCONTINUED | OUTPATIENT
Start: 2023-02-08 | End: 2023-02-08 | Stop reason: HOSPADM

## 2023-02-07 RX ORDER — ASCORBIC ACID 500 MG
1000 TABLET ORAL DAILY
Status: DISCONTINUED | OUTPATIENT
Start: 2023-02-08 | End: 2023-02-08 | Stop reason: HOSPADM

## 2023-02-07 RX ORDER — ACETAMINOPHEN 325 MG/1
650 TABLET ORAL
Status: DISCONTINUED | OUTPATIENT
Start: 2023-02-07 | End: 2023-02-08 | Stop reason: HOSPADM

## 2023-02-07 RX ORDER — METOPROLOL SUCCINATE 50 MG/1
50 TABLET, EXTENDED RELEASE ORAL DAILY
Status: DISCONTINUED | OUTPATIENT
Start: 2023-02-08 | End: 2023-02-08 | Stop reason: HOSPADM

## 2023-02-07 RX ORDER — ACETAMINOPHEN 650 MG/1
650 SUPPOSITORY RECTAL
Status: DISCONTINUED | OUTPATIENT
Start: 2023-02-07 | End: 2023-02-08 | Stop reason: HOSPADM

## 2023-02-07 RX ORDER — TAMSULOSIN HYDROCHLORIDE 0.4 MG/1
0.4 CAPSULE ORAL DAILY
Status: DISCONTINUED | OUTPATIENT
Start: 2023-02-08 | End: 2023-02-08 | Stop reason: HOSPADM

## 2023-02-07 RX ORDER — DIAZEPAM 10 MG/2ML
5 INJECTION INTRAMUSCULAR ONCE
Status: COMPLETED | OUTPATIENT
Start: 2023-02-07 | End: 2023-02-07

## 2023-02-07 RX ORDER — FINASTERIDE 5 MG/1
5 TABLET, FILM COATED ORAL DAILY
Status: DISCONTINUED | OUTPATIENT
Start: 2023-02-07 | End: 2023-02-08 | Stop reason: HOSPADM

## 2023-02-07 RX ORDER — ONDANSETRON 2 MG/ML
4 INJECTION INTRAMUSCULAR; INTRAVENOUS ONCE
Status: COMPLETED | OUTPATIENT
Start: 2023-02-07 | End: 2023-02-07

## 2023-02-07 RX ORDER — GUAIFENESIN 100 MG/5ML
81 LIQUID (ML) ORAL DAILY
Status: DISCONTINUED | OUTPATIENT
Start: 2023-02-08 | End: 2023-02-08 | Stop reason: HOSPADM

## 2023-02-07 RX ADMIN — DIAZEPAM 5 MG: 5 INJECTION, SOLUTION INTRAMUSCULAR; INTRAVENOUS at 13:13

## 2023-02-07 RX ADMIN — ONDANSETRON 4 MG: 2 INJECTION INTRAMUSCULAR; INTRAVENOUS at 12:23

## 2023-02-07 RX ADMIN — APIXABAN 5 MG: 5 TABLET, FILM COATED ORAL at 22:09

## 2023-02-07 RX ADMIN — IOPAMIDOL 140 ML: 755 INJECTION, SOLUTION INTRAVENOUS at 13:26

## 2023-02-07 RX ADMIN — FINASTERIDE 5 MG: 5 TABLET, FILM COATED ORAL at 22:09

## 2023-02-07 NOTE — ED NOTES
Stroke Education provided to patient and the following topics were discussed    1. Patients personal risk factors for stroke are atrial fibrillation    2. Warning signs of Stroke:        * Sudden numbness or weakness of the face, arm or leg, especially on one side of          The body            * Sudden confusion, trouble speaking or understanding        * Sudden trouble seeing in one or both eyes        * Sudden trouble walking, dizziness, loss of balance or coordination        * Sudden severe headache with no known cause      3. Importance of activation Emergency Medical Services ( 9-1-1 ) immediately if experience any warning signs of stroke. 4. Be sure and schedule a follow-up appointment with your primary care doctor or any specialists as instructed. 5. You must take medicine every day to treat your risk factors for stroke. Be sure to take your medicines exactly as your doctor tells you: no more, no less. Know what your medicines are for , what they do. Anti-thrombotics /anticoagulants can help prevent strokes. You are taking the following medicine(s)  Eliquis     6. Smoking and second-hand smoke greatly increase your risk of stroke, cardiovascular disease and death. Smoking history cigarettes, 3 per day or ended year 1970    7. Information provided was Stroke Handouts or Verbal Education    8. Documentation of teaching completed in Patient Education Activity and on Care Plan with teaching response noted?   yes

## 2023-02-07 NOTE — ED NOTES
Signs and symptoms: dizziness and nausea   Code Stroke activation time: 1153  Provider at bedside time:  1153  VAN score: Negative  Last Known Well (Time): 1030 am  Blood Glucose Result/Time:  144 @ 1156  Blood Pressure: 165/95  Anticoagulants (List medications): eliquis

## 2023-02-07 NOTE — ED TRIAGE NOTES
Pt arrives to the ER for complaints of dizziness, chest pain, and nausea that started about a hour ago. Pt reports that he is also having bilateral blurry vision that is intermittent. Denies any numbness or tingling, or changes in speech.      PMH of afib, takes eliquis

## 2023-02-07 NOTE — H&P
[facility]  [address] [facility] [address]  [phone]    Hospitalist Admission Note      NAME:  Lilibeth Etienne   :   1945   MRN:  [id number]     PCP:  Aracelis Watson MD     Date/Time of service:  2023 2:31 PM          Subjective:     CHIEF COMPLAINT: Dizziness    HISTORY OF PRESENT ILLNESS:     Mr. Rosario  is a 69 y.o.  male who presented to the Emergency Department complaining of dizziness. Patient is alert awake oriented x3. As per the patient he woke up at 7:30 AM this morning. He was feeling fine at that time but around 10 AM he was walking to the bathroom when he started feeling dizzy. He denies any weakness or numbness but he was nauseated and had vomiting. He denies any diarrhea constipation. He was also complaining of headache and chest tightness. He does have a history of A-fib and he had a ablation done last year. He is on Eliquis. He had a CT of head and neck done which did not show any large vessel occlusion. His wife is at bedside. Past medical [id number], hypertension, BPH, hyperlipidemia. Past surgical history-cardiac ablation. Family history-grandfather with coronary artery disease. Social History     Tobacco Use    Smoking status: Former     Packs/day: 0.50     Years: 4.00     Pack years: 2.00     Types: Cigarettes     Quit date: 1970     Years since quittin.1    Smokeless tobacco: Current    Tobacco comments:     Dips snuff   Substance Use Topics    Alcohol use: Yes     Comment: rarely        No Known Allergies     Prior to Admission medications    Medication Sig Start Date End Date Taking? Authorizing Provider   tadalafiL (CIALIS) 5 mg tablet Take 5 mg by mouth daily as needed for Erectile Dysfunction.     Provider, Historical   finasteride (PROSCAR) 5 mg tablet TAKE 1 TABLET BY MOUTH ONCE DAILY AT NIGHT 10/9/22   Provider, Historical   metoprolol succinate (TOPROL-XL) 50 mg XL tablet Take 50 mg by mouth daily. 10/6/22   Provider, Historical   atorvastatin (LIPITOR) 20 mg tablet Take 20 mg by mouth daily. 9/5/22   Provider, Historical   ascorbic acid, vitamin C, (VITAMIN C) 1,000 mg tablet Take 1,000 mg by mouth daily. Provider, Historical   rutin/hesp/bioflav/C/eexlkb305 (BIOFLEX PO) Take 1 Tablet by mouth two (2) times a day. Provider, Historical   tamsulosin (FLOMAX) 0.4 mg capsule Take 0.4 mg by mouth daily. Provider, Historical   apixaban (ELIQUIS) 5 mg tablet Take 5 mg by mouth two (2) times a day.     Provider, Historical       Review of Systems:  (bold if positive, if negative)    Gen:  Eyes:  ENT:  CVS:  Pulm:  GI:  :  MS:  Skin:  Psych:  Endo:  Hem:  Renal:  Neuro:        Objective:      VITALS:    Vital signs reviewed; most recent are:    Visit Vitals  /72   Pulse (!) 58   Temp 97.4 °F (36.3 °C)   Resp 15   Ht 6' 3\" (1.905 m)   Wt 138.8 kg (306 lb)   SpO2 96%   BMI 38.25 kg/m²     SpO2 Readings from Last 6 Encounters:   02/07/23 96%   02/03/23 96%   10/24/22 94%   11/27/18 94%    O2 Flow Rate (L/min): 2 l/min   No intake or output data in the 24 hours ending 02/07/23 4565     Exam:     Physical Exam:    Gen:  Well-developed, well-nourished, in no acute distress  HEENT:  Pink conjunctivae, PERRL, hearing intact to voice, moist mucous membranes  Neck:  Supple, without masses, thyroid non-tender  Resp:  No accessory muscle use, clear breath sounds without wheezes rales or rhonchi  Card:  No murmurs, normal S1, S2 without thrills, bruits or peripheral edema  Abd:  Soft, non-tender, non-distended, normoactive bowel sounds are present, no mass  Lymph:  No cervical or inguinal adenopathy  Musc:  No cyanosis or clubbing  Skin:  No rashes or ulcers, skin turgor is good  Neuro:  Cranial nerves are grossly intact, no focal motor weakness, follows commands appropriately  Psych:  Good insight, oriented to person, place and time, alert     Labs:    Recent Labs     02/07/23  1156   WBC 7.6   HGB 14.3   HCT 44.2        Recent Labs     02/07/23  1156      K 3.4*      CO2 23   *   BUN 24*   CREA 1.01   CA 9.3   ALB 3.8   TBILI 0.6   ALT 64     Lab Results   Component Value Date/Time    Glucose (POC) 144 (H) 02/07/2023 11:56 AM     No results for input(s): PH, PCO2, PO2, HCO3, FIO2 in the last 72 hours. Recent Labs     02/07/23  1156   INR 1.1     All Micro Results       None            I have reviewed previous records       Assessment and Plan:      Principal Problem:    TIA (transient ischemic attack) (2/7/2023)    Active Problems:    ISADORA on CPAP (10/24/2022)      S/P ablation of atrial fibrillation (12/27/2022)      BPH (benign prostatic hyperplasia) (2/7/2023)     Plan:    1. TIA-MRI brain, echocardiogram, start aspirin. Neurology consult. 2.  BPH-continue Proscar and Flomax. 3.  History of atrial fibrillation-he is on Toprol-XL and Eliquis. He had an ablation done in the past.    4.  Hyperlipidemia-continue statins. 5.  Obstructive sleep apnea-on CPAP at night. 6.  DVT prophylaxis-SCDs. Patient is also on Eliquis. Patient is a full code. Plan of care discussed with the patient and the family at bedside. Telemetry reviewed:   normal sinus rhythm    Risk of deterioration: medium      Total time spent with patient: 79 Minutes I personally reviewed chart, notes, data and current medications in the medical record. I have personally examined and treated the patient at bedside during this period. To assist coordination of care and communication with nursing and staff, this note may be preliminary early in the day, but finalized by end of the day.                  Care Plan discussed with: Patient and Family    Discussed:  Code Status and Care Plan       ___________________________________________________    Attending Physician: Jair Armando MD

## 2023-02-07 NOTE — ED PROVIDER NOTES
71-year-old male with past medical history of atrial fibrillation status post ablation chronically on Eliquis, ISADORA on CPAP, obesity, lumbar degenerative disc disease who presents with acute onset dizziness approximately 1.5 hours ago. Patient reports he woke up feeling normal this morning, he went to use the bathroom and had sudden onset dizziness with what he describes as room spinning. At that time he also had new onset difficulty with walking. He has never had the symptoms in the past and never been diagnosed with vertigo. He denies weakness in arms or legs, slurred speech, facial droop. Patient does have history of a cervical spinal procedure and describes intermittent dysphagia since that time, he does not report any new changes to his underlying symptoms. No past medical history on file. No past surgical history on file. No family history on file. Social History     Socioeconomic History    Marital status:      Spouse name: Not on file    Number of children: Not on file    Years of education: Not on file    Highest education level: Not on file   Occupational History    Not on file   Tobacco Use    Smoking status: Former     Packs/day: 0.50     Years: 4.00     Pack years: 2.00     Types: Cigarettes     Quit date: 5     Years since quittin.1    Smokeless tobacco: Current    Tobacco comments:     Dips snuff   Substance and Sexual Activity    Alcohol use: Yes     Comment: rarely    Drug use: Not on file    Sexual activity: Not on file   Other Topics Concern    Not on file   Social History Narrative    Not on file     Social Determinants of Health     Financial Resource Strain: Not on file   Food Insecurity: Not on file   Transportation Needs: Not on file   Physical Activity: Not on file   Stress: Not on file   Social Connections: Not on file   Intimate Partner Violence: Not on file   Housing Stability: Not on file         ALLERGIES: Patient has no known allergies.     Review of Systems   Constitutional:  Negative for chills and fever. HENT:  Negative for congestion and sore throat. Respiratory:  Negative for cough, shortness of breath and wheezing. Cardiovascular:  Negative for chest pain and palpitations. Gastrointestinal:  Negative for abdominal pain, nausea and vomiting. Genitourinary:  Negative for dysuria and flank pain. Musculoskeletal:  Negative for arthralgias and myalgias. Skin:  Negative for rash and wound. Neurological:  Positive for dizziness and light-headedness. Negative for tremors, seizures, syncope, facial asymmetry, speech difficulty, weakness, numbness and headaches. Psychiatric/Behavioral:  Negative for agitation, behavioral problems and confusion. There were no vitals filed for this visit. Physical Exam  Constitutional:       Appearance: Normal appearance. He is ill-appearing (actively vomiting). HENT:      Head: Normocephalic and atraumatic. Right Ear: External ear normal.      Left Ear: External ear normal.      Nose: Nose normal. No congestion or rhinorrhea. Mouth/Throat:      Mouth: Mucous membranes are moist.      Pharynx: Oropharynx is clear. Eyes:      General: No scleral icterus. Extraocular Movements: Extraocular movements intact. Pupils: Pupils are equal, round, and reactive to light. Cardiovascular:      Rate and Rhythm: Normal rate and regular rhythm. Heart sounds: Normal heart sounds. No murmur heard. No friction rub. No gallop. Pulmonary:      Effort: Pulmonary effort is normal.      Breath sounds: Normal breath sounds. No wheezing, rhonchi or rales. Abdominal:      General: Abdomen is flat. There is no distension. Palpations: Abdomen is soft. Tenderness: There is no abdominal tenderness. Musculoskeletal:         General: Normal range of motion. Cervical back: Normal range of motion and neck supple. Skin:     General: Skin is warm and dry.    Neurological:      Mental Status: He is alert and oriented to person, place, and time. Cranial Nerves: No cranial nerve deficit. Sensory: No sensory deficit. Motor: No weakness. Coordination: Coordination normal.      Gait: Gait abnormal (ataxic). Psychiatric:         Mood and Affect: Mood normal.         Behavior: Behavior normal.        Medical Decision Making  Perfect Serve Consult for Admission  2:13 PM    ED Room Number: ER12/12  Patient Name and age:  Luis Eduardo Pham 69 y.o.  male  Working Diagnosis: No diagnosis found. COVID-19 Suspicion:  no  Sepsis present:  no  Reassessment needed: no  Code Status:  Full Code  Readmission: no  Isolation Requirements:  no  Recommended Level of Care:  tele/neuro  Department:[facility] ED - [phone]  Other:    28-year-old male with history of atrial fibrillation status post ablation on blood thinner who presents with acute onset dizziness and ataxic gait. Last known well 10:30 AM this morning. Code stroke called and teleneurology consulted. Per their evaluation they thought low likelihood for posterior circulation stroke and more likely BPPV however recommended admission to the hospital for neurology consult and MRI. ED Course as of 02/07/23 1215   Tue Feb 07, 2023   1206 EKG shows sinus rhythm with a rate of 65, 4 3 AV block, otherwise normal intervals, normal axis, no ischemic changes.  [RD]      ED Course User Index  [RD] Isaura Paez MD       Procedures

## 2023-02-08 ENCOUNTER — TELEPHONE (OUTPATIENT)
Dept: CARDIOLOGY CLINIC | Age: 78
End: 2023-02-08

## 2023-02-08 VITALS
SYSTOLIC BLOOD PRESSURE: 120 MMHG | OXYGEN SATURATION: 94 % | WEIGHT: 303.7 LBS | BODY MASS INDEX: 37.76 KG/M2 | RESPIRATION RATE: 18 BRPM | HEART RATE: 59 BPM | HEIGHT: 75 IN | TEMPERATURE: 97.5 F | DIASTOLIC BLOOD PRESSURE: 77 MMHG

## 2023-02-08 LAB
ATRIAL RATE: 65 BPM
CALCULATED P AXIS, ECG09: 27 DEGREES
CALCULATED R AXIS, ECG10: 20 DEGREES
CALCULATED T AXIS, ECG11: 63 DEGREES
CHOLEST SERPL-MCNC: 136 MG/DL
DIAGNOSIS, 93000: NORMAL
EST. AVERAGE GLUCOSE BLD GHB EST-MCNC: 120 MG/DL
HBA1C MFR BLD: 5.8 % (ref 4–5.6)
HDLC SERPL-MCNC: 56 MG/DL
HDLC SERPL: 2.4 (ref 0–5)
LDLC SERPL CALC-MCNC: 63.4 MG/DL (ref 0–100)
P-R INTERVAL, ECG05: 210 MS
Q-T INTERVAL, ECG07: 466 MS
QRS DURATION, ECG06: 100 MS
QTC CALCULATION (BEZET), ECG08: 484 MS
TRIGL SERPL-MCNC: 83 MG/DL (ref ?–150)
VENTRICULAR RATE, ECG03: 65 BPM
VLDLC SERPL CALC-MCNC: 16.6 MG/DL

## 2023-02-08 PROCEDURE — 97161 PT EVAL LOW COMPLEX 20 MIN: CPT

## 2023-02-08 PROCEDURE — 97165 OT EVAL LOW COMPLEX 30 MIN: CPT

## 2023-02-08 PROCEDURE — 97116 GAIT TRAINING THERAPY: CPT

## 2023-02-08 PROCEDURE — 36415 COLL VENOUS BLD VENIPUNCTURE: CPT

## 2023-02-08 PROCEDURE — 80061 LIPID PANEL: CPT

## 2023-02-08 PROCEDURE — 97535 SELF CARE MNGMENT TRAINING: CPT

## 2023-02-08 PROCEDURE — 74011250637 HC RX REV CODE- 250/637: Performed by: HOSPITALIST

## 2023-02-08 PROCEDURE — G0378 HOSPITAL OBSERVATION PER HR: HCPCS

## 2023-02-08 PROCEDURE — 99223 1ST HOSP IP/OBS HIGH 75: CPT | Performed by: PSYCHIATRY & NEUROLOGY

## 2023-02-08 RX ORDER — GUAIFENESIN 100 MG/5ML
81 LIQUID (ML) ORAL DAILY
Qty: 30 TABLET | Refills: 0 | Status: SHIPPED
Start: 2023-02-09

## 2023-02-08 RX ADMIN — TAMSULOSIN HYDROCHLORIDE 0.4 MG: 0.4 CAPSULE ORAL at 08:08

## 2023-02-08 RX ADMIN — APIXABAN 5 MG: 5 TABLET, FILM COATED ORAL at 08:08

## 2023-02-08 RX ADMIN — FINASTERIDE 5 MG: 5 TABLET, FILM COATED ORAL at 08:08

## 2023-02-08 RX ADMIN — OXYCODONE HYDROCHLORIDE AND ACETAMINOPHEN 1000 MG: 500 TABLET ORAL at 08:08

## 2023-02-08 RX ADMIN — METOPROLOL SUCCINATE 50 MG: 50 TABLET, EXTENDED RELEASE ORAL at 08:08

## 2023-02-08 RX ADMIN — ASPIRIN 81 MG: 81 TABLET, CHEWABLE ORAL at 08:07

## 2023-02-08 RX ADMIN — ATORVASTATIN CALCIUM 20 MG: 20 TABLET, FILM COATED ORAL at 08:08

## 2023-02-08 NOTE — CONSULTS
[facility] Neurology [facility] and [address] at [facility] Neurology Clinics at [facility] [place], [address] [facility] [address] [address]  [phone] Office  [phone] Facsimile           Referring: Dr. Jayden Lieberman    Chief Complaint   Patient presents with    Dizziness   We are asked to see this 67-year-old gentleman in neurologic consultation regarding an acute change in his neurologic status. The patient has atrial fibrillation and has had an ablation in the past is on Eliquis and he presented to the emergency department with acute onset dizziness. He describes spinning of the room. Denies any other focality. In the emergency department he was ill-appearing and actively vomiting. He had an ataxic gait. EKG sinus with no acute changes  Dry head CT unremarkable  CTA of the head and neck with insignificant stenosis in the carotids and no significant stenosis intracranially. CT perfusion unremarkable  MRI of the brain unremarkable  Lipid panel with an LDL of 63  Coags unremarkable  Metabolic panel unremarkable  CBC unremarkable    This morning he is seen with his wife at the bedside. He tells me he was in the midst of his morning constitutional when he all of a sudden had the abrupt onset of spinning of the room. He had significant nausea and vomiting with this as well. He had no other focal symptomology. He is never had anything like this before. He did not miss any of his Eliquis. Symptoms lasted about 4 hours and he feels completely well now. No past medical history on file. No past surgical history on file.     Current Facility-Administered Medications   Medication Dose Route Frequency Provider Last Rate Last Admin    apixaban (ELIQUIS) tablet 5 mg  5 mg Oral BID Jayant Chandra MD   5 mg at 02/08/23 3086    ascorbic acid (vitamin C) (VITAMIN C) tablet 1,000 mg  1,000 mg Oral DAILY Jessika Moseley Jayant COSTA MD   1,000 mg at 23 7057    atorvastatin (LIPITOR) tablet 20 mg  20 mg Oral DAILY Jayant Duff MD   20 mg at 23 4850    finasteride (PROSCAR) tablet 5 mg  5 mg Oral DAILY Jayant Duff MD   5 mg at 23 9264    metoprolol succinate (TOPROL-XL) XL tablet 50 mg  50 mg Oral DAILY Jayant Duff MD   50 mg at 23 6957    tamsulosin (FLOMAX) capsule 0.4 mg  0.4 mg Oral DAILY Jayant Duff MD   0.4 mg at 23 4297    acetaminophen (TYLENOL) tablet 650 mg  650 mg Oral Q4H PRN Tere Parikh MD        Or    acetaminophen (TYLENOL) solution 650 mg  650 mg Per NG tube Q4H PRN Tere Parikh MD        Or    acetaminophen (TYLENOL) suppository 650 mg  650 mg Rectal Q4H PRN Tere Parikh MD        aspirin chewable tablet 81 mg  81 mg Oral DAILY Jayant Duff MD   81 mg at 23 0807        No Known Allergies    Social History     Tobacco Use    Smoking status: Former     Packs/day: 0.50     Years: 4.00     Pack years: 2.00     Types: Cigarettes     Quit date: 1970     Years since quittin.1    Smokeless tobacco: Current    Tobacco comments:     Dips snuff   Substance Use Topics    Alcohol use: Yes     Comment: rarely       No family history on file. Review of Systems  Pertinent positives and negatives as noted. Examination  Visit Vitals  /71   Pulse 60   Temp 97.8 °F (36.6 °C)   Resp 18   Ht 6' 3\" (1.905 m)   Wt 137.8 kg (303 lb 11.2 oz)   SpO2 95%   BMI 37.96 kg/m²     Neurologically, he is awake, alert, oriented with normal speech, language, and cognition. Cranial nerves intact 2-12. He has normal bulk and tone. There is no abnormal movement. There is no pronation or drift. He is able to generate full strength in the upper and lower extremities and all muscle groups to direct confrontational testing. Reflexes are symmetrical in the upper and lower extremities bilaterally. No pathologic reflexes are elicited.   He has no ataxia or past pointing. Impression/Plan  67-year-old gentleman with about 4 hours worth of vertigo associated with nausea and vomiting  Examination is completely normal and reassuring  Imaging is normal and reassuring  I do not believe this was a TIA as symptoms lasted for several hours and there is no indication of stroke on the MRI which I think would be unlikely  Continue with Eliquis  Continue to modify modifiable risk factors for stroke including weight loss, exercise etc.  I have no objection to him being discharged today  Follow-up in the clinic as needed    Anoop Alexander MD          This note was created using voice recognition software. Despite editing, there may be syntax errors.

## 2023-02-08 NOTE — ACP (ADVANCE CARE PLANNING)
Advance Care Planning   Advance Care Planning Inpatient Note  [address] [facility]    Today's Date: 2/8/2023  Unit: [facility] 3 PROG CARE TELE 2    Received request from In Basket. Upon review of chart and communication with care team, patient's decision making abilities are not in question. Patient and Spouse was/were present in the room during visit. Goals of ACP Conversation:  Discuss Advance Care planning documents  Facilitate a discussion related to patient's goals of care as they align with the patient's values and beliefs    Health Care Decision Makers:      Primary Decision Maker: Levi Neff - [phone]    Secondary Decision Maker: Mar Germain - [phone]    Summary:  Completed [address] (Patient Wishes) on file:  Healthcare Power of /Advance Directive appointment of Health care agent  Living Will/ Advance Directive  Anatomical Gift/Organ donation     Assessment:     Received a request from the [facility] for an Advance Medical Directive (AMD) for the patient on [facility]. Reviewed patient's chart and spoke with patient's nurse. Introduced self and chaplaincy. Patient and wife shared recent medical history. Patient dima through family support and spending time on the [place]. Pt shared he finds germaine in spending time with family, especially his grandson who calls him Pock, and spending time on the Julia with is wife and family. No spiritual or emotional needs noted at this time. Pt and wife shared stories of their life in [place], [place], and their return to [place]. With the patient, completed the AMD which reflects his values and decisions for a time when he cannot speak for himself. Assessed coping with current hospitalization. Listened attentively. Facilitated storytelling. Affirmed patient's hope in many more good years.  Affirmed decision making that reflects his wishes. Reviewed historical coping. Reflected values statements. Advised of  availability. Please contact Spiritual Care for further referrals.       Interventions:  Provided education on documents for clarity and greater understanding  Discussed and provided education on state decision maker hierarchy  Assisted in the completion of documents according to patient's wishes at this time  Encouraged ongoing ACP conversation with future decision makers and loved ones    Care Preferences Communicated:  No    Outcomes/Plan:  ACP Discussion Completed  New Advance Directive completed  Returned original document(s) to patient, as well as copies for distribution to appointed agents  Copy of Advance Directive given to staff to scan into medical record  Teach Back Method used to verify the patient/Healthcare Decision Maker's understanding of key information in the advance directive documents    Herb Cockayne, [facility] on 2/8/2023 at 1:22 PM

## 2023-02-08 NOTE — ED NOTES
TRANSFER - OUT REPORT:    Verbal report given to JACQUIE (name) on Socorro Skelton  being transferred to [id number] (unit) for routine progression of care       Report consisted of patients Situation, Background, Assessment and   Recommendations(SBAR). Information from the following report(s) SBAR, Kardex, ED Summary, MAR, and Recent Results was reviewed with the receiving nurse. Lines:   Peripheral IV 02/07/23 Right Antecubital (Active)   Site Assessment Clean, dry, & intact 02/07/23 1157   Phlebitis Assessment 0 02/07/23 1157   Infiltration Assessment 0 02/07/23 1157   Dressing Status Clean, dry, & intact 02/07/23 1157   Hub Color/Line Status Pink 02/07/23 1157   Action Taken Blood drawn 02/07/23 1157        Opportunity for questions and clarification was provided.       Patient transported with:   [employer]

## 2023-02-08 NOTE — PROGRESS NOTES
Spiritual Care Assessment/Progress Note  [address]      NAME: Velma Gomez      MRN: [id number]  AGE: 69 y.o.  SEX: male  Christian Affiliation: Judaism   Language: English     2/8/2023     Total Time (in minutes): 80     Spiritual Assessment begun in [facility] 3 [facility] 2 through conversation with:         [x]Patient        [x] Family    [] Friend(s)        Reason for Consult: Advance medical directive consult     Spiritual beliefs: (Please include comment if needed)     [x] Identifies with a lilian tradition:    Judaism     [] Supported by a lilian community:            [] Claims no spiritual orientation:           [] Seeking spiritual identity:                [] Adheres to an individual form of spirituality:           [] Not able to assess:                           Identified resources for coping:      [] Prayer                               [] Music                  [] Guided Imagery     [x] Family/friends                 [] Pet visits     [] Devotional reading                         [] Unknown     [] Other:                                               Interventions offered during this visit: (See comments for more details)    Patient Interventions: Advance medical directive completed, Affirmation of emotions/emotional suffering, Coping skills reviewed/reinforced, End of life issues discussed, Normalization of emotional/spiritual concerns, Catharsis/review of pertinent events in supportive environment     Family/Friend(s): Normalization of emotional/spiritual concerns, Coping skills reviewed/reinforced, Affirmation of emotions/emotional suffering, Catharsis/review of pertinent events in supportive environment, End of life issues discussed, Christian beliefs/image of God discussed     Plan of Care:     [] Support spiritual and/or cultural needs    [] Support AMD and/or advance care planning process      [] Support grieving process   [] Coordinate Rites and/or Rituals    [] Coordination with community clergy   [] No spiritual needs identified at this time   [] Detailed Plan of Care below (See Comments)  [] Make referral to Music Therapy  [] Make referral to Pet Therapy     [] Make referral to Addiction services  [] Make referral to [facility]  [] Make referral to Spiritual Care Partner  [] No future visits requested        [x] Contact Spiritual Care for further referrals     Comments: Received a request from the In Basket for an Advance Medical Directive (AMD) for the patient on [facility]. Reviewed patient's chart and spoke with patient's nurse. Introduced self and chaplaincy. Patient and wife shared recent medical history. Patient dima through family support and spending time on the Julia. Pt shared he finds germaine in spending time with family, especially his grandson who calls him Pock, and spending time on the Julia with is wife and family. No spiritual or emotional needs noted at this time. Pt and wife shared stories of their life in [place], [place], and their return to [facility]. With the patient, completed the AMD which reflects his values and decisions for a time when he cannot speak for himself. Assessed coping with current hospitalization. Listened attentively. Facilitated storytelling. Affirmed patient's hope in many more good years. Affirmed decision making that reflects his wishes. Reviewed historical coping. Reflected values statements. Advised of  availability. Please contact Spiritual Care for further referrals.     Dimitris. 79, Nyla Ugarte 87, Tanisha 69, [facility]  Staff   Paging service: [phone] (EMILY)

## 2023-02-08 NOTE — DISCHARGE SUMMARY
Kenneth Reynoldselsen kusum [place] [address]  [address] [facility], [address] [place] [address]  [phone] [address] Adult  Hospitalist Group     Discharge Summary       PATIENT ID: Rose Bautista  MRN: [id number]   YOB: 1945    DATE OF ADMISSION: 2/7/2023 12:01 PM    DATE OF DISCHARGE: 02/08/23   PRIMARY CARE PROVIDER: Nanette Hilliard MD     DISCHARGING PROVIDER: Erika Jordan MD      CONSULTATIONS: IP CONSULT TO NEUROLOGY    PROCEDURES/SURGERIES: * No surgery found *    ADMITTING [address]:     Dizziness  -normal brain MRI  -neuro evaluated, no concern for stroke but recommends baby ASA    BPH  -cont proscar and flomax    Hx afib  -cont toprol and eliquis  -ablation in the past    HLD  -cont statin    ISADORA  -cont CPAP      PENDING TEST RESULTS:   At the time of discharge the following test results are still pending: none    FOLLOW UP APPOINTMENTS:    Follow-up Information       Follow up With Specialties Details Why Contact Info    Nanette Hilliard MD Family Medicine Follow up in 1 week(s) Hospital discharge follow up [place] [id number]      Nanette Hilliard, [address]  [phone]                 DIET: regular    ACTIVITY: as tolerated       DISCHARGE MEDICATIONS:  Current Discharge Medication List        START taking these medications    Details   aspirin 81 mg chewable tablet Take 1 Tablet by mouth daily. Qty: 30 Tablet, Refills: 0  Start date: 2/9/2023           CONTINUE these medications which have NOT CHANGED    Details   tadalafiL (CIALIS) 5 mg tablet Take 5 mg by mouth daily as needed for Erectile Dysfunction. finasteride (PROSCAR) 5 mg tablet TAKE 1 TABLET BY MOUTH ONCE DAILY AT NIGHT      metoprolol succinate (TOPROL-XL) 50 mg XL tablet Take 50 mg by mouth daily. atorvastatin (LIPITOR) 20 mg tablet Take 20 mg by mouth daily. ascorbic acid, vitamin C, (VITAMIN C) 1,000 mg tablet Take 1,000 mg by mouth daily. rutin/hesp/bioflav/C/ocauqq228 (BIOFLEX PO) Take 1 Tablet by mouth two (2) times a day. tamsulosin (FLOMAX) 0.4 mg capsule Take 0.4 mg by mouth daily. apixaban (ELIQUIS) 5 mg tablet Take 5 mg by mouth two (2) times a day. NOTIFY YOUR PHYSICIAN FOR ANY OF THE FOLLOWING:   Fever over 101 degrees for 24 hours. Chest pain, shortness of breath, fever, chills, nausea, vomiting, diarrhea, change in mentation, falling, weakness, bleeding. Severe pain or pain not relieved by medications. Or, any other signs or symptoms that you may have questions about. DISPOSITION:   x Home With:   OT  PT  HH  RN       Long term SNF/Inpatient Rehab    Independent/assisted living    Hospice    Other:         PHYSICAL EXAMINATION AT DISCHARGE:  General:          Alert, cooperative, no distress, appears stated age. HEENT:           Atraumatic, anicteric sclerae, pink conjunctivae                          No oral ulcers, mucosa moist, throat clear, dentition fair  Neck:               Supple, symmetrical  Lungs:             Clear to auscultation bilaterally. No Wheezing or Rhonchi. No rales. Heart:              Regular  rhythm,  No  murmur   No edema  Abdomen:        Soft, non-tender. Not distended. Bowel sounds normal  Extremities:     No cyanosis. No clubbing,                            Skin turgor normal, Capillary refill normal  Skin:                Not pale. Not Jaundiced  No rashes   Psych:             Not anxious or agitated.   Neurologic:      Alert, moves all extremities, answers questions appropriately and responds to commands       CHRONIC MEDICAL DIAGNOSES:  Problem List as of 2/8/2023 Date Reviewed: 2/7/2023            Codes Class Noted - Resolved    * (Principal) TIA (transient ischemic attack) ICD-10-CM: G45.9  ICD-9-CM: 435.9  2/7/2023 - Present        BPH (benign prostatic hyperplasia) ICD-10-CM: N40.0  ICD-9-CM: 600.00  2/7/2023 - Present        S/P ablation of atrial fibrillation ICD-10-CM: Z98.890, Z86.79  ICD-9-CM: V45.89  12/27/2022 - Present        Severe obesity (BMI 35.0-35.9 with comorbidity) ([facility] ICD-10-CM: E66.01, Z68.35  ICD-9-CM: 278.01, V85.35  10/24/2022 - Present        Longstanding persistent atrial fibrillation (HCC) ICD-10-CM: I48.11  ICD-9-CM: 427.31  10/24/2022 - Present        ISADORA on CPAP ICD-10-CM: G47.33, Z99.89  ICD-9-CM: 327.23, V46.8  10/24/2022 - Present        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, NYHA class 2 ([facility].) ICD-10-CM: I11.0  ICD-9-CM: 401.0, 428.30, 428.0  10/24/2022 - Present        Anticoagulation adequate ICD-10-CM: Z79.01  ICD-9-CM: V58.61  10/23/2022 - Present        Atrial fibrillation ([facility].) ICD-10-CM: I48.91  ICD-9-CM: 427.31  2/16/2022 - Present        Sciatica ICD-10-CM: M54.30  ICD-9-CM: 724.3  11/15/2018 - Present        Lumbar degenerative disc disease ICD-10-CM: M51.36  ICD-9-CM: 722.52  5/9/2018 - Present           Greater than 30 minutes were spent with the patient on counseling and coordination of care    Signed:   Junior Walton MD  2/8/2023  12:09 PM

## 2023-02-08 NOTE — PROGRESS NOTES
Speech pathology note  Reviewed chart and note patient admitted with dizziness with concern for CVA. Note CT showed No acute abnormality and MRI showed No acute intracranial abnormality. Note patient passed the nursing dysphagia screen and a regular diet was ordered. NIHSS=0. Discussed case with RN who reported no SLP-related concerns. Formal SLP evaluation not clinically indicated at this time. Will sign off. Please re-consult if further needs arise. Thank you.     Patrick Cabezas, SLP Yes

## 2023-02-08 NOTE — PROGRESS NOTES
PHYSICAL THERAPY EVALUATION/DISCHARGE  Patient: Noe Lazo (31 y.o. male)  Date: 2/8/2023  Primary Diagnosis: TIA (transient ischemic attack) [G45.9]       Precautions: universal         ASSESSMENT  Based on the objective data described below, the patient presents with independent with ambulation without assistive device as well as up and down stairs and independent with all functional mobility. Reviewed sign and symptoms of stroke with the patient and verbalized understanding. Reviewed all safety precaution and home exercise program with the patient, verbalized understanding, clear to go home per Physical Therapy perspective. Skilled physical therapy is not indicated at this time. .    Functional Outcome Measure: The patient scored 100/100 on the barthel index outcome measure . Other factors to consider for discharge: none     Further skilled acute physical therapy is not indicated at this time. PLAN :  Recommendation for discharge: (in order for the patient to meet his/her long term goals)  No skilled physical therapy/ follow up rehabilitation needs identified at this time. This discharge recommendation:  Has been made in collaboration with the attending provider and/or case management    IF patient discharges home will need the following DME: none       SUBJECTIVE:   Patient stated Rickey Glance to go home.     OBJECTIVE DATA SUMMARY:   HISTORY:    No past medical history on file. No past surgical history on file. Prior level of function: Independent community ambulator without assistive device.    Personal factors and/or comorbidities impacting plan of care:     Home Situation  Home Environment: Private residence  # Steps to Enter: 3  One/Two Story Residence: One story  Living Alone: No  Support Systems: Spouse/Significant Other  Patient Expects to be Discharged to[de-identified] Home  Current DME Used/Available at Home: None    EXAMINATION/PRESENTATION/DECISION MAKING:   Critical Behavior:  Neurologic State: Alert  Orientation Level: Oriented X4  Cognition: Appropriate decision making, Appropriate for age attention/concentration, Appropriate safety awareness     Hearing: Auditory  Auditory Impairment: None    Range Of Motion:  AROM: Within functional limits           PROM: Within functional limits           Strength:    Strength: Within functional limits                    Tone & Sensation:                                  Coordination:  Coordination: Within functional limits  Vision:      Functional Mobility:  Bed Mobility:  Rolling: Independent  Supine to Sit: Independent  Sit to Supine: Independent  Scooting: Independent  Transfers:  Sit to Stand: Independent  Stand to Sit: Independent  Stand Pivot Transfers: Independent     Bed to Chair: Independent              Balance:   Sitting: Intact  Standing: Intact; Without support  Ambulation/Gait Training:  Distance (ft): 200 Feet (ft)     Ambulation - Level of Assistance: Independent     Gait Description (WDL): Within defined limits                                          Stairs:  Number of Stairs Trained: 4  Stairs - Level of Assistance: Modified independent   Rail Use: Both    Therapeutic Exercises:   Educate and instructed patient to continue active range of motion exercise on both legs while up on chair or on bed multiple times. Recommend patient to be up on the chair at least 3 times a day every meal times as tolerated. Functional Measure:  Barthel Index:    Bathin  Bladder: 10  Bowels: 10  Groomin  Dressing: 10  Feeding: 10  Mobility: 15  Stairs: 10  Toilet Use: 10  Transfer (Bed to Chair and Back): 15  Total: 100/100       The Barthel ADL Index: Guidelines  1. The index should be used as a record of what a patient does, not as a record of what a patient could do. 2. The main aim is to establish degree of independence from any help, physical or verbal, however minor and for whatever reason.   3. The need for supervision renders the patient not independent. 4. A patient's performance should be established using the best available evidence. Asking the patient, friends/relatives and nurses are the usual sources, but direct observation and common sense are also important. However direct testing is not needed. 5. Usually the patient's performance over the preceding 24-48 hours is important, but occasionally longer periods will be relevant. 6. Middle categories imply that the patient supplies over 50 per cent of the effort. 7. Use of aids to be independent is allowed. Score Interpretation (from [facility] 83)    Independent   60-79 Minimally independent   40-59 Partially dependent   20-39 Very dependent   <20 Totally dependent     -Bogdan Barone., Barthel, D.W. (1965). Functional evaluation: the Barthel Index. [address] ([address] [place] [address] (1997). The Barthel activities of daily living index: self-reporting versus actual performance in the old (> or = 75 years). Journal of [address] [place] [address] 45(7), 14 [facility], JRITIKA, Laurie Costello., Michael Knott. (1999). Measuring the change in disability after inpatient rehabilitation; comparison of the responsiveness of the Barthel Index and Functional Douglas Measure. Journal of Neurology, Neurosurgery, and Psychiatry, 66(4), 388-801. April Romano, N.J.A, MIGUEL Seymour, & Mazin De Anda MSERG. (2004) Assessment of post-stroke quality of life in cost-effectiveness studies: The usefulness of the Barthel Index and the EuroQoL-5D.  Quality of Life Research, 15, 768-41           Physical Therapy Evaluation Charge Determination   History Examination Presentation Decision-Making   LOW Complexity : Zero comorbidities / personal factors that will impact the outcome / POC LOW Complexity : 1-2 Standardized tests and measures addressing body structure, function, activity limitation and / or participation in recreation  LOW Complexity : Stable, uncomplicated Other outcome measures barthel  LOW       Based on the above components, the patient evaluation is determined to be of the following complexity level: LOW     Pain Ratin/10    Activity Tolerance:   Good      After treatment patient left in no apparent distress:   Sitting in chair, Heels elevated for pressure relief, Call bell within reach, and Caregiver / family present    COMMUNICATION/EDUCATION:   The patients plan of care was discussed with: Occupational therapist, Registered nurse, and Case management. Fall prevention education was provided and the patient/caregiver indicated understanding. Thank you for this referral.  Shalom Cuenca PT,WCC.    Time Calculation: 29 mins

## 2023-02-08 NOTE — PROGRESS NOTES
Bedside and Verbal shift change report given to Edgar Back (oncoming nurse) by Ryan Fuentes (offgoing nurse). Report included the following information SBAR, Kardex, Intake/Output, Cardiac Rhythm sinus rhythm, Alarm Parameters , and Dual Neuro Assessment.

## 2023-02-08 NOTE — PROGRESS NOTES
Problem: Falls - Risk of  Goal: *Absence of Falls  Description: Document [place] Araceli Fall Risk and appropriate interventions in the flowsheet.   Outcome: Progressing Towards Goal  Note: Fall Risk Interventions:  Mobility Interventions: Bed/chair exit alarm, Patient to call before getting OOB

## 2023-02-08 NOTE — PROGRESS NOTES
2/8/2023  1:32 PM  CM completed assessment w/ pt in person. Charted demographics verified, pt lives w/ spouse Kayla Brandt (Claudia Gan) [phone] in a 2  story home, there are 3 [address] and he has a 1st floor bedroom. At baseline pt is ambulatory, independent w./ his ALS, and drives, no fall history  Reason for Admission:  Emergent for TIA  Pt is a 70 yo male who [facility] to [facility] c/o dizziness, denies weakness or numbness  Past medical rhlhneb-D-inh, hypertension, BPH, hyperlipidemia. RUR Score:      N/A pt is OBS Low Risk of Readmission/Green  DME: None  Rx: pt has insurance and uses the [employer] is normally able to afford is medications                 Plan for utilizing home health:   no history of [facility] or Rehab       PCP: First and Last name:  Shanique Rousseau MD     Name of Practice:    Are you a current patient: Yes/No: yes    Approximate date of last visit: 12/2022   Can you participate in a virtual visit with your PCP: yes                    Current Advanced Directive/Advance Care Plan: Full Code  HCDM/NOK spouse Troy Blow [facility]    Healthcare Decision Maker:   Click here to complete [address] including selection of the Healthcare Decision Maker Relationship (ie \"Primary\")             Primary Decision Maker: Ibrahima Prado - Spouse - [phone]    Secondary Decision Maker: Gregorymorafaith Dorsey - [phone]                  Transition of Care Plan:                    RUR N/A pt  is OBS Low Risk of Readmission/Green  LOS 1 Day  Hospital admission for medical management   Neurology consult  PT, OT SLP tristan completed no skilled services at DC   AMD planning, pt completed w/   DC when stable to home   Outpatient follow up PCP, specialists  Family will transport at [facility] Management Interventions  PCP Verified by CM:  Yes Jose Daly MD)  Palliative Care Criteria Met (RRAT>21 & CHF Dx)?: No  Mode of Transport at Discharge: Self (spouse)  Transition of Care Consult (CM Consult): Discharge Planning  Physical Therapy Consult: Yes  Occupational Therapy Consult: Yes  Speech Therapy Consult: Yes  Support Systems: Spouse/Significant Other  Confirm Follow Up Transport: Self  Discharge Location  Patient Expects to be Discharged to[de-identified] Home with family assistance  Care Management Interventions  PCP Verified by CM: Yes Lisa Mayorga MD)  Palliative Care Criteria Met (RRAT>21 & CHF Dx)?: No  Mode of Transport at Discharge: Self (spouse)  Transition of Care Consult (CM Consult): Discharge Planning  Physical Therapy Consult: Yes  Occupational Therapy Consult: Yes  Speech Therapy Consult: Yes  Support Systems: Spouse/Significant Other  Confirm Follow Up Transport: Self  Discharge Location  Patient Expects to be Discharged to[de-identified] Home with family assistance  MJ Collado       1:07 PM  CM attempted to complete assessment,  at bedside for AMD   Will attempt again.   MJ Collado

## 2023-02-08 NOTE — PROGRESS NOTES
Medicare Outpatient Observation Notice (MOON)/ [place] Outpatient Observation Notice (Charla Oden) provided to patient/representative with verbal explanation of the notice. Time allotted for questions regarding the notice. Patient /representative provided a completed copy of the MOON/VOON notice.       Lenora Lama LCSW

## 2023-02-08 NOTE — TELEPHONE ENCOUNTER
Pt discharged from hospital on 2/8/23 and prescribed aspirin 81mg, pt wanted to check with the doctor before starting the aspirin, pt currently taking elquis 5mg, please advise      Pt#   [phone]

## 2023-02-08 NOTE — PROGRESS NOTES
Problem: Falls - Risk of  Goal: *Absence of Falls  Description: Document Jimmie Patel Fall Risk and appropriate interventions in the flowsheet.   2/8/2023 1233 by Lizz RAMOS  Outcome: Resolved/Met  2/8/2023 1233 by Lizz RAMOS  Outcome: Progressing Towards Goal  Note: Fall Risk Interventions:  Mobility Interventions: Bed/chair exit alarm, Patient to call before getting OOB                             Problem: Patient Education: Go to Patient Education Activity  Goal: Patient/Family Education  2/8/2023 1233 by Lizz RAMOS  Outcome: Resolved/Met  2/8/2023 1233 by Lizz RAMOS  Outcome: Progressing Towards Goal     Problem: Patient Education: Go to Patient Education Activity  Goal: Patient/Family Education  2/8/2023 1233 by Lizz RAMOS  Outcome: Resolved/Met  2/8/2023 1233 by Lizz RAMOS  Outcome: Progressing Towards Goal     Problem: TIA/CVA Stroke: 0-24 hours  Goal: Off Pathway (Use only if patient is Off Pathway)  2/8/2023 1233 by Lizz RAMOS  Outcome: Resolved/Met  2/8/2023 1233 by Lizz RAMOS  Outcome: Progressing Towards Goal  Goal: Activity/Safety  2/8/2023 1233 by Lizz Kaba  Outcome: Resolved/Met  2/8/2023 1233 by Lizz RAMOS  Outcome: Progressing Towards Goal  Goal: Consults, if ordered  2/8/2023 1233 by Lizz RAMOS  Outcome: Resolved/Met  2/8/2023 1233 by Lizz RAMOS  Outcome: Progressing Towards Goal  Goal: Diagnostic Test/Procedures  2/8/2023 1233 by Lizz RAMOS  Outcome: Resolved/Met  2/8/2023 1233 by Lizz RAMOS  Outcome: Progressing Towards Goal  Goal: Nutrition/Diet  2/8/2023 1233 by Lizz RAMOS  Outcome: Resolved/Met  2/8/2023 1233 by Lizz RAMOS  Outcome: Progressing Towards Goal  Goal: Discharge Planning  2/8/2023 1233 by Lizz RAMOS  Outcome: Resolved/Met  2/8/2023 1233 by Stephane RAMOS  Outcome: Progressing Towards Goal  Goal: Medications  2/8/2023 1233 by Stephane RAMOS  Outcome: Resolved/Met  2/8/2023 1233 by Stephane RAMOS  Outcome: Progressing Towards Goal  Goal: Respiratory  2/8/2023 1233 by Stephane RAMOS  Outcome: Resolved/Met  2/8/2023 1233 by Stephane RAMOS  Outcome: Progressing Towards Goal  Goal: Treatments/Interventions/Procedures  2/8/2023 1233 by Stephane RAMOS  Outcome: Resolved/Met  2/8/2023 1233 by Stephane RAMOS  Outcome: Progressing Towards Goal  Goal: Minimize risk of bleeding post-thrombolytic infusion  2/8/2023 1233 by Stephane RAMOS  Outcome: Resolved/Met  2/8/2023 1233 by Stephane RAMOS  Outcome: Progressing Towards Goal  Goal: Monitor for complications post-thrombolytic infusion  2/8/2023 1233 by Stephane RAMOS  Outcome: Resolved/Met  2/8/2023 1233 by Stephane RAMOS  Outcome: Progressing Towards Goal  Goal: Psychosocial  2/8/2023 1233 by Stephane RAMOS  Outcome: Resolved/Met  2/8/2023 1233 by Stephane RAMOS  Outcome: Progressing Towards Goal  Goal: *Hemodynamically stable  2/8/2023 1233 by Stephane RAMOS  Outcome: Resolved/Met  2/8/2023 1233 by Stephane RAMOS  Outcome: Progressing Towards Goal  Goal: *Neurologically stable  Description: Absence of additional neurological deficits    2/8/2023 1233 by Stephane RAMOS  Outcome: Resolved/Met  2/8/2023 1233 by Stephane RAMOS  Outcome: Progressing Towards Goal  Goal: *Verbalizes anxiety and depression are reduced or absent  2/8/2023 1233 by Stephane RAMOS  Outcome: Resolved/Met  2/8/2023 1233 by Stephane RAMOS  Outcome: Progressing Towards Goal  Goal: *Absence of Signs of Aspiration on Current Diet  2/8/2023 1233 by Stephane RAMOS  Outcome: Resolved/Met  2/8/2023 1233 by Akila RAMOS  Outcome: Progressing Towards Goal  Goal: *Absence of deep venous thrombosis signs and symptoms(Stroke Metric)  2/8/2023 1233 by Akila RAMOS  Outcome: Resolved/Met  2/8/2023 1233 by Akila RAMOS  Outcome: Progressing Towards Goal  Goal: *Ability to perform ADLs and demonstrates progressive mobility and function  2/8/2023 1233 by Akila RAMOS  Outcome: Resolved/Met  2/8/2023 1233 by Akila RAMOS  Outcome: Progressing Towards Goal  Goal: *Stroke education started(Stroke Metric)  2/8/2023 1233 by Akila RAMOS  Outcome: Resolved/Met  2/8/2023 1233 by Akila RAMOS  Outcome: Progressing Towards Goal  Goal: *Dysphagia screen performed(Stroke Metric)  2/8/2023 1233 by Akila RAMOS  Outcome: Resolved/Met  2/8/2023 1233 by Akila RAMOS  Outcome: Progressing Towards Goal  Goal: *Rehab consulted(Stroke Metric)  2/8/2023 1233 by Akila RAMOS  Outcome: Resolved/Met  2/8/2023 1233 by Akila RAMOS  Outcome: Progressing Towards Goal     Problem: TIA/CVA Stroke: Day 2 Until Discharge  Goal: Off Pathway (Use only if patient is Off Pathway)  2/8/2023 1233 by Akila RAMOS  Outcome: Resolved/Met  2/8/2023 1233 by Akila RAMOS  Outcome: Progressing Towards Goal  Goal: Activity/Safety  2/8/2023 1233 by Akila Sweeney  Outcome: Resolved/Met  2/8/2023 1233 by Akila RAMOS  Outcome: Progressing Towards Goal  Goal: Diagnostic Test/Procedures  2/8/2023 1233 by Akila RAMOS  Outcome: Resolved/Met  2/8/2023 1233 by Akila RAMOS  Outcome: Progressing Towards Goal  Goal: Nutrition/Diet  2/8/2023 1233 by Akila RAMOS  Outcome: Resolved/Met  2/8/2023 1233 by Akila RAMOS  Outcome: Progressing Towards Goal  Goal: Discharge Planning  2/8/2023 1233 by Frederic RAMOS  Outcome: Resolved/Met  2/8/2023 1233 by Frederic RAMOS  Outcome: Progressing Towards Goal  Goal: Medications  2/8/2023 1233 by Frederic RAMOS  Outcome: Resolved/Met  2/8/2023 1233 by Frederic RAMOS  Outcome: Progressing Towards Goal  Goal: Respiratory  2/8/2023 1233 by Frederic RAMOS  Outcome: Resolved/Met  2/8/2023 1233 by Frederic RAMOS  Outcome: Progressing Towards Goal  Goal: Treatments/Interventions/Procedures  2/8/2023 1233 by Frederic RAMOS  Outcome: Resolved/Met  2/8/2023 1233 by Frederic RAMOS  Outcome: Progressing Towards Goal  Goal: Psychosocial  2/8/2023 1233 by Frederic RAMOS  Outcome: Resolved/Met  2/8/2023 1233 by Frederic RAMOS  Outcome: Progressing Towards Goal  Goal: *Verbalizes anxiety and depression are reduced or absent  2/8/2023 1233 by Frederic RAMOS  Outcome: Resolved/Met  2/8/2023 1233 by Frederic RAMOS  Outcome: Progressing Towards Goal  Goal: *Absence of aspiration  2/8/2023 1233 by Frederic RAMOS  Outcome: Resolved/Met  2/8/2023 1233 by Frederic RAMOS  Outcome: Progressing Towards Goal  Goal: *Absence of deep venous thrombosis signs and symptoms(Stroke Metric)  2/8/2023 1233 by Frederic RAMOS  Outcome: Resolved/Met  2/8/2023 1233 by Frederic RAMOS  Outcome: Progressing Towards Goal  Goal: *Optimal pain control at patient's stated goal  2/8/2023 1233 by Frederic RAMOS  Outcome: Resolved/Met  2/8/2023 1233 by Frederic RAMOS  Outcome: Progressing Towards Goal  Goal: *Tolerating diet  2/8/2023 1233 by Frederic RAMOS  Outcome: Resolved/Met  2/8/2023 1233 by Frederic RAMOS  Outcome: Progressing Towards Goal  Goal: *Ability to perform ADLs and demonstrates progressive mobility and function  2/8/2023 1233 by Frederic Gaxiola S  Outcome: Resolved/Met  2/8/2023 1233 by Amanda RAMOS  Outcome: Progressing Towards Goal  Goal: *Stroke education continued(Stroke Metric)  2/8/2023 1233 by Amanda RAMOS  Outcome: Resolved/Met  2/8/2023 1233 by Amanda RAMOS  Outcome: Progressing Towards Goal     Problem: Ischemic Stroke: Discharge Outcomes  Goal: *Verbalizes anxiety and depression are reduced or absent  2/8/2023 1233 by Amanda RAMOS  Outcome: Resolved/Met  2/8/2023 1233 by Amanda RAMOS  Outcome: Progressing Towards Goal  Goal: *Verbalize understanding of risk factor modification(Stroke Metric)  2/8/2023 1233 by Amanda RAMOS  Outcome: Resolved/Met  2/8/2023 1233 by Amanda RAMOS  Outcome: Progressing Towards Goal  Goal: *Hemodynamically stable  2/8/2023 1233 by Amanad RAMOS  Outcome: Resolved/Met  2/8/2023 1233 by Amanda RAMOS  Outcome: Progressing Towards Goal  Goal: *Absence of aspiration pneumonia  2/8/2023 1233 by Amanda RAMOS  Outcome: Resolved/Met  2/8/2023 1233 by Amanda RAMOS  Outcome: Progressing Towards Goal  Goal: *Aware of needed dietary changes  2/8/2023 1233 by Amanda RAMOS  Outcome: Resolved/Met  2/8/2023 1233 by Amanda RAMOS  Outcome: Progressing Towards Goal  Goal: *Verbalize understanding of prescribed medications including anti-coagulants, anti-lipid, and/or anti-platelets(Stroke Metric)  2/8/2023 1233 by Amanda RAMOS  Outcome: Resolved/Met  2/8/2023 1233 by Amanda RAMOS  Outcome: Progressing Towards Goal  Goal: *Tolerating diet  2/8/2023 1233 by Amanda RAMOS  Outcome: Resolved/Met  2/8/2023 1233 by Amanda RAMOS  Outcome: Progressing Towards Goal  Goal: *Aware of follow-up diagnostics related to anticoagulants  2/8/2023 1233 by Amanda RAMOS  Outcome: Resolved/Met  2/8/2023 1233 by Stephen RAMOS  Outcome: Progressing Towards Goal  Goal: *Ability to perform ADLs and demonstrates progressive mobility and function  2/8/2023 1233 by Stephen RAMOS  Outcome: Resolved/Met  2/8/2023 1233 by Stephen RAMOS  Outcome: Progressing Towards Goal  Goal: *Absence of DVT(Stroke Metric)  2/8/2023 1233 by Stephen RAMOS  Outcome: Resolved/Met  2/8/2023 1233 by Stephen RAMOS  Outcome: Progressing Towards Goal  Goal: *Absence of aspiration  2/8/2023 1233 by Stephen RAMOS  Outcome: Resolved/Met  2/8/2023 1233 by Stephen RAMOS  Outcome: Progressing Towards Goal  Goal: *Optimal pain control at patient's stated goal  2/8/2023 1233 by Stephen RAMOS  Outcome: Resolved/Met  2/8/2023 1233 by Stephen RAMOS  Outcome: Progressing Towards Goal  Goal: *Home safety concerns addressed  2/8/2023 1233 by Stephen RAMOS  Outcome: Resolved/Met  2/8/2023 1233 by Stephen RAMOS  Outcome: Progressing Towards Goal  Goal: *Describes available resources and support systems  2/8/2023 1233 by Stephen RAMOS  Outcome: Resolved/Met  2/8/2023 1233 by Stephen RAMOS  Outcome: Progressing Towards Goal  Goal: *Verbalizes understanding of activation of EMS(911) for stroke symptoms(Stroke Metric)  2/8/2023 1233 by Stephen RAMOS  Outcome: Resolved/Met  2/8/2023 1233 by Stephen RAMOS  Outcome: Progressing Towards Goal  Goal: *Understands and describes signs and symptoms to report to providers(Stroke Metric)  2/8/2023 1233 by Stephen RAMOS  Outcome: Resolved/Met  2/8/2023 1233 by Stephen RAMOS  Outcome: Progressing Towards Goal  Goal: *Neurolgocially stable (absence of additional neurological deficits)  2/8/2023 1233 by Stephen RAMOS  Outcome: Resolved/Met  2/8/2023 1233 by Stephen RAMOS  Outcome: Progressing Towards Goal  Goal: *Verbalizes importance of follow-up with primary care physician(Stroke Metric)  2/8/2023 1233 by Ronda RAMOS  Outcome: Resolved/Met  2/8/2023 1233 by Ronda RAMOS  Outcome: Progressing Towards Goal  Goal: *Smoking cessation discussed,if applicable(Stroke Metric)  2/8/2023 1233 by Ronda RAMOS  Outcome: Resolved/Met  2/8/2023 1233 by Ronda RAMOS  Outcome: Progressing Towards Goal  Goal: *Depression screening completed(Stroke Metric)  2/8/2023 1233 by Ronda RAMOS  Outcome: Resolved/Met  2/8/2023 1233 by Ronda RAMOS  Outcome: Progressing Towards Goal

## 2023-02-08 NOTE — PROGRESS NOTES
OCCUPATIONAL THERAPY EVALUATION/DISCHARGE  Patient: Roberto Cottrell (89 y.o. male)  Date: 2/8/2023  Primary Diagnosis: TIA (transient ischemic attack) [G45.9]       Precautions: universal       ASSESSMENT  Based on the objective data described below, the patient presents with good overall activity tolerance following admission on 2/7/23 for dizziness. Patient has undergone neurological work-up since admission; CT and MRI both negative for acute process. Patient performed transfers and ADLs without LOB or physical assistance needed. Patient educated on general home safety, energy conservation techniques, and signs and symptoms of stroke. Patient has no further skilled OT to needs and cleared from OT services at this time. Current Level of Function (ADLs/self-care): Functional mobility and ADLs, independent     Functional Outcome Measure: The patient scored 66/66 on the Fugl-Vincent Assessment of Upper Extremity outcome measure which is indicative of no noted functional impairments. PLAN :    Recommendation for discharge: (in order for the patient to meet his/her long term goals)  No skilled occupational therapy/ follow up rehabilitation needs identified at this time. This discharge recommendation:  Has been made in collaboration with the attending provider and/or case management    IF patient discharges home will need the following DME: none       SUBJECTIVE:   Patient agreeable to OT evaluation. OBJECTIVE DATA SUMMARY:   HISTORY:   No past medical history on file. No past surgical history on file. Prior Level of Function/Environment/Context: Patient lives with family.    Expanded or extensive additional review of patient history:   Home Situation  Home Environment: Private residence  # Steps to Enter: 3  One/Two Story Residence: One story  Living Alone: No  Support Systems: Spouse/Significant Other  Patient Expects to be Discharged to[de-identified] Home with family assistance  Current DME Used/Available at Home: None    Hand dominance: Right    EXAMINATION OF PERFORMANCE DEFICITS:  Cognitive/Behavioral Status:  Neurologic State: Alert  Orientation Level: Oriented X4  Cognition: Follows commands  Perception: Appears intact  Perseveration: No perseveration noted  Safety/Judgement: Awareness of environment    Skin: Intact in the uppers    Edema: None noted in the uppers    Hearing: Auditory  Auditory Impairment: None    Vision/Perceptual:    Tracking: Able to track stimulus in all quadrants w/o difficulty    Saccades: Within Defined Limits    Convergence: Normal (within 6 inches from nose)    Visual Fields:  (WDL)  Diplopia: No    Acuity: Within Defined Limits    Corrective Lenses: Glasses    Range of Motion:  AROM: Within functional limits  PROM: Within functional limits    Strength:  Strength: Within functional limits    Coordination:  Fine Motor Skills-Upper: Left Intact; Right Intact    Gross Motor Skills-Upper: Left Intact; Right Intact    Tone & Sensation:  Tone: normal  Sensation: intact     Balance:  Sitting: Intact  Standing: Intact; Without support    Functional Mobility and Transfers for ADLs:  Bed Mobility:  Rolling: Independent  Supine to Sit: Independent  Sit to Supine: Independent  Scooting: Independent    Transfers:  Sit to Stand: Independent  Stand to Sit: Independent  Bed to Chair: Independent  Bathroom Mobility: Independent  Toilet Transfer : Independent  Tub Transfer: Independent    ADL Assessment:  Feeding: Independent    Oral Facial Hygiene/Grooming: Independent    Bathing: Independent     Upper Body Dressing: Independent    Lower Body Dressing: Independent    Toileting: Independent    Cognitive Retraining  Safety/Judgement: Awareness of environment    Functional Measure:  66/66 Fugl-Vincent Assessment of Upper Extremity    Occupational Therapy Evaluation Charge Determination   History Examination Decision-Making   LOW Complexity : Brief history review  LOW Complexity : 1-3 performance deficits relating to physical, cognitive , or psychosocial skils that result in activity limitations and / or participation restrictions  LOW Complexity : No comorbidities that affect functional and no verbal or physical assistance needed to complete eval tasks       Based on the above components, the patient evaluation is determined to be of the following complexity level: LOW   Activity Tolerance:   Good    After treatment patient left in no apparent distress:    Sitting in chair, Call bell within reach, and Bed / chair alarm activated    COMMUNICATION/EDUCATION:   The patients plan of care was discussed with: Physical therapist, Registered nurse, and patient .      Thank you for this referral.  Jonny Huddleston OTR/L  Time Calculation: 23 mins

## 2023-02-08 NOTE — PROGRESS NOTES
0700  Bedside and Verbal shift change report given to Marjorie Martini (oncoming nurse) by Valdo Jason RN (offgoing nurse). Report included the following information SBAR, Kardex, Procedure Summary, Intake/Output, MAR, Accordion, and Recent Results. Initial assessment done. AOX4. No complaints of pain. Will continue to monitor. This patient was assisted with Intentional Toileting every 2 hours during this shift as appropriate. Documentation of ambulation and output reflected on Flowsheet as appropriate. Purposeful hourly rounding was completed using AIDET and 5Ps. Outcomes of PHR documented as they occurred. Bed alarm in use as appropriate. Dual Suction and ambubag in place. Visit Vitals  /77 (BP 1 Location: Left upper arm, BP Patient Position: At rest)   Pulse (!) 59   Temp 97.5 °F (36.4 °C)   Resp 18   Ht 6' 3\" (1.905 m)   Wt 137.8 kg (303 lb 11.2 oz)   SpO2 94%   BMI 37.96 kg/m²     1400  I have reviewed discharge instructions with the patient and spouse. The patient and spouse verbalized understanding.

## 2023-02-08 NOTE — ED NOTES
Bedside and Verbal shift change report given to Trish by Anthony Oneal. Report included the following information SBAR, ED Summary, MAR, and Recent Results.

## 2023-02-09 NOTE — TELEPHONE ENCOUNTER
WENDY Sanabria, RN 44 minutes ago (8:50 AM)       The neurologist didn't advise aspirin and didn't think it was a TIA. I recommend he continue Eliquis and stop aspirin. Thanks! Returned patient call, ID verified using two patient identifiers. Informed patient of above recommendations per Rosales Tong. Patient states he is taking the Eliquis and never started the ASA. Patient verbalizes understanding of all information.

## 2023-04-03 ENCOUNTER — TRANSCRIBE ORDER (OUTPATIENT)
Dept: SCHEDULING | Age: 78
End: 2023-04-03

## 2023-04-03 DIAGNOSIS — M54.16 LUMBAR RADICULOPATHY: Primary | ICD-10-CM

## 2023-05-03 ENCOUNTER — OFFICE VISIT (OUTPATIENT)
Dept: CARDIOLOGY CLINIC | Age: 78
End: 2023-05-03
Payer: MEDICARE

## 2023-05-03 VITALS
WEIGHT: 309 LBS | HEIGHT: 75 IN | OXYGEN SATURATION: 96 % | RESPIRATION RATE: 20 BRPM | SYSTOLIC BLOOD PRESSURE: 124 MMHG | HEART RATE: 68 BPM | BODY MASS INDEX: 38.42 KG/M2 | DIASTOLIC BLOOD PRESSURE: 72 MMHG

## 2023-05-03 DIAGNOSIS — E66.01 SEVERE OBESITY (BMI 35.0-39.9) WITH COMORBIDITY (HCC): ICD-10-CM

## 2023-05-03 DIAGNOSIS — Z98.890 S/P ABLATION OF ATRIAL FIBRILLATION: ICD-10-CM

## 2023-05-03 DIAGNOSIS — I11.0: ICD-10-CM

## 2023-05-03 DIAGNOSIS — G47.33 OSA ON CPAP: ICD-10-CM

## 2023-05-03 DIAGNOSIS — E66.01 SEVERE OBESITY (BMI 35.0-35.9 WITH COMORBIDITY) (HCC): ICD-10-CM

## 2023-05-03 DIAGNOSIS — Z86.79 S/P ABLATION OF ATRIAL FIBRILLATION: ICD-10-CM

## 2023-05-03 DIAGNOSIS — I48.11 LONGSTANDING PERSISTENT ATRIAL FIBRILLATION (HCC): Primary | ICD-10-CM

## 2023-05-03 DIAGNOSIS — Z79.01 ANTICOAGULATION ADEQUATE: ICD-10-CM

## 2023-05-03 DIAGNOSIS — Z99.89 OSA ON CPAP: ICD-10-CM

## 2023-05-03 DIAGNOSIS — G45.9 TIA (TRANSIENT ISCHEMIC ATTACK): ICD-10-CM

## 2023-05-03 PROCEDURE — G0463 HOSPITAL OUTPT CLINIC VISIT: HCPCS | Performed by: INTERNAL MEDICINE

## 2023-05-03 RX ORDER — ACETAMINOPHEN 500 MG
1000 TABLET ORAL
COMMUNITY

## 2023-05-31 ENCOUNTER — TELEPHONE (OUTPATIENT)
Age: 78
End: 2023-05-31

## 2023-05-31 NOTE — TELEPHONE ENCOUNTER
Clearance forms received from [facility]. Forms given to Isauro Asif to review and complete.  Once completed we will fax forms back to [facility] @ [phone]

## 2023-05-31 NOTE — TELEPHONE ENCOUNTER
Completed clearance and med management form faxed to [address] @ [phone]. Confirmation received.

## 2023-05-31 NOTE — TELEPHONE ENCOUNTER
[facility] called needing clearance on pt, June 5 is surgery date,please advise    May [phone] fax [phone]

## 2023-07-10 ENCOUNTER — TELEPHONE (OUTPATIENT)
Age: 78
End: 2023-07-10

## 2023-07-11 NOTE — TELEPHONE ENCOUNTER
Requested Prescriptions     Signed Prescriptions Disp Refills    apixaban (ELIQUIS) 5 MG TABS tablet 60 tablet 5     Sig: Take 1 tablet by mouth 2 times daily     Authorizing Provider: MAMTA ALICEA     Ordering User: Amos ANGELA     Refills per verbal order from Dr. Sheri Landau.   Last OV: 5/3/23  Next OV: 11/3/23

## 2023-08-14 ENCOUNTER — TELEPHONE (OUTPATIENT)
Age: 78
End: 2023-08-14

## 2023-08-14 NOTE — TELEPHONE ENCOUNTER
Returned patient call, ID verified using two patient identifiers. Mr. Timo Kenney is calling to make sure his 1 week event monitor is ordered prior to his visit with Dr. Jazzy Beckman on 11/3. He will be traveling to [place] from 9/24/23 to 10/24/23. Advised Mr. Timo Kenney that there will not be enough time for us to get the results back if he receives the monitor when he returns and then has to mail it back. Moved his appointment with Dr. Jazzy Beckman out 2 weeks to allow time for the monitor to arrive and be returned. Mr. Timo Kenney is also requesting that a refill for his eliquis be sent for 90 days so that he will have enough during his trip. Refill sent.     Requested Prescriptions     Signed Prescriptions Disp Refills    apixaban (ELIQUIS) 5 MG TABS tablet 180 tablet 3     Sig: Take 1 tablet by mouth 2 times daily     Authorizing Provider: MAMTA ALICEA     Ordering User: Elisa Munguia     Future Appointments   Date Time Provider [address] [place] [address]   11/17/2023 10:20 AM Mamta Dalton MD [facility]

## 2023-08-14 NOTE — TELEPHONE ENCOUNTER
Pt called stated he needs to schedule test however he will be going on trip out of town on 09/24 will return 24th of Oct. PT also stated last refill for his medication Eliquis he only rec'vd 1 month supply he will need 2 month supply for his next refill.  PT would like a call to discuss ASAP    PT# [phone]

## 2023-11-17 ENCOUNTER — OFFICE VISIT (OUTPATIENT)
Age: 78
End: 2023-11-17
Payer: MEDICARE

## 2023-11-17 VITALS
SYSTOLIC BLOOD PRESSURE: 112 MMHG | HEIGHT: 75 IN | DIASTOLIC BLOOD PRESSURE: 68 MMHG | OXYGEN SATURATION: 93 % | WEIGHT: 303.8 LBS | BODY MASS INDEX: 37.77 KG/M2 | HEART RATE: 61 BPM

## 2023-11-17 DIAGNOSIS — Z98.890 S/P ABLATION OF ATRIAL FIBRILLATION: ICD-10-CM

## 2023-11-17 DIAGNOSIS — I11.0: ICD-10-CM

## 2023-11-17 DIAGNOSIS — Z79.01 ANTICOAGULATION ADEQUATE: ICD-10-CM

## 2023-11-17 DIAGNOSIS — E66.01 SEVERE OBESITY (BMI 35.0-35.9 WITH COMORBIDITY) (HCC): ICD-10-CM

## 2023-11-17 DIAGNOSIS — G47.33 OSA ON CPAP: ICD-10-CM

## 2023-11-17 DIAGNOSIS — Z86.79 S/P ABLATION OF ATRIAL FIBRILLATION: ICD-10-CM

## 2023-11-17 DIAGNOSIS — I48.11 LONGSTANDING PERSISTENT ATRIAL FIBRILLATION (HCC): Primary | ICD-10-CM

## 2023-11-17 DIAGNOSIS — G45.9 TIA (TRANSIENT ISCHEMIC ATTACK): ICD-10-CM

## 2023-11-17 PROCEDURE — G8417 CALC BMI ABV UP PARAM F/U: HCPCS | Performed by: INTERNAL MEDICINE

## 2023-11-17 PROCEDURE — G8484 FLU IMMUNIZE NO ADMIN: HCPCS | Performed by: INTERNAL MEDICINE

## 2023-11-17 PROCEDURE — G8427 DOCREV CUR MEDS BY ELIG CLIN: HCPCS | Performed by: INTERNAL MEDICINE

## 2023-11-17 PROCEDURE — 99214 OFFICE O/P EST MOD 30 MIN: CPT | Performed by: INTERNAL MEDICINE

## 2023-11-17 PROCEDURE — 4004F PT TOBACCO SCREEN RCVD TLK: CPT | Performed by: INTERNAL MEDICINE

## 2023-11-17 PROCEDURE — 3078F DIAST BP <80 MM HG: CPT | Performed by: INTERNAL MEDICINE

## 2023-11-17 PROCEDURE — 3074F SYST BP LT 130 MM HG: CPT | Performed by: INTERNAL MEDICINE

## 2023-11-17 PROCEDURE — 1123F ACP DISCUSS/DSCN MKR DOCD: CPT | Performed by: INTERNAL MEDICINE

## 2023-11-17 NOTE — PATIENT INSTRUCTIONS
[facility]  CPAP compliance  Goal weight loss of 10%, 30 lbs  Goal blood pressure less than 140/90 mmHg    Obtain 1 week event monitor in 11 months  FU with Dr. Jazzy Beckman in 1 year

## 2024-03-29 ENCOUNTER — HOSPITAL ENCOUNTER (EMERGENCY)
Facility: HOSPITAL | Age: 79
Discharge: HOME | End: 2024-03-29
Attending: EMERGENCY MEDICINE
Payer: MEDICARE

## 2024-03-29 ENCOUNTER — APPOINTMENT (OUTPATIENT)
Facility: HOSPITAL | Age: 79
End: 2024-03-29
Payer: MEDICARE

## 2024-03-29 VITALS
OXYGEN SATURATION: 96 % | BODY MASS INDEX: 37.92 KG/M2 | TEMPERATURE: 98.4 F | HEART RATE: 70 BPM | DIASTOLIC BLOOD PRESSURE: 89 MMHG | SYSTOLIC BLOOD PRESSURE: 128 MMHG | RESPIRATION RATE: 18 BRPM | WEIGHT: 305 LBS | HEIGHT: 75 IN

## 2024-03-29 DIAGNOSIS — L03.116 CELLULITIS OF LEFT LOWER EXTREMITY: Primary | ICD-10-CM

## 2024-03-29 LAB
ALBUMIN SERPL-MCNC: 3.5 G/DL (ref 3.5–5)
ALBUMIN/GLOB SERPL: 0.9 (ref 1.1–2.2)
ALP SERPL-CCNC: 70 U/L (ref 45–117)
ALT SERPL-CCNC: 42 U/L (ref 12–78)
ANION GAP SERPL CALC-SCNC: 8 MMOL/L (ref 5–15)
AST SERPL-CCNC: 33 U/L (ref 15–37)
BASOPHILS # BLD: 0 K/UL (ref 0–0.1)
BASOPHILS NFR BLD: 0 % (ref 0–1)
BILIRUB SERPL-MCNC: 1.6 MG/DL (ref 0.2–1)
BUN SERPL-MCNC: 22 MG/DL (ref 6–20)
BUN/CREAT SERPL: 19 (ref 12–20)
CALCIUM SERPL-MCNC: 9.4 MG/DL (ref 8.5–10.1)
CHLORIDE SERPL-SCNC: 105 MMOL/L (ref 97–108)
CO2 SERPL-SCNC: 25 MMOL/L (ref 21–32)
COMMENT:: NORMAL
COMMENT:: NORMAL
CREAT SERPL-MCNC: 1.15 MG/DL (ref 0.7–1.3)
DIFFERENTIAL METHOD BLD: ABNORMAL
EOSINOPHIL # BLD: 0 K/UL (ref 0–0.4)
EOSINOPHIL NFR BLD: 0 % (ref 0–7)
ERYTHROCYTE [DISTWIDTH] IN BLOOD BY AUTOMATED COUNT: 12.7 % (ref 11.5–14.5)
GLOBULIN SER CALC-MCNC: 4 G/DL (ref 2–4)
GLUCOSE SERPL-MCNC: 124 MG/DL (ref 65–100)
HCT VFR BLD AUTO: 43.5 % (ref 36.6–50.3)
HGB BLD-MCNC: 14.8 G/DL (ref 12.1–17)
IMM GRANULOCYTES # BLD AUTO: 0.1 K/UL (ref 0–0.04)
IMM GRANULOCYTES NFR BLD AUTO: 0 % (ref 0–0.5)
LYMPHOCYTES # BLD: 1.6 K/UL (ref 0.8–3.5)
LYMPHOCYTES NFR BLD: 12 % (ref 12–49)
MCH RBC QN AUTO: 30 PG (ref 26–34)
MCHC RBC AUTO-ENTMCNC: 34 G/DL (ref 30–36.5)
MCV RBC AUTO: 88.1 FL (ref 80–99)
MONOCYTES # BLD: 0.8 K/UL (ref 0–1)
MONOCYTES NFR BLD: 6 % (ref 5–13)
NEUTS SEG # BLD: 10.9 K/UL (ref 1.8–8)
NEUTS SEG NFR BLD: 82 % (ref 32–75)
NRBC # BLD: 0 K/UL (ref 0–0.01)
NRBC BLD-RTO: 0 PER 100 WBC
PLATELET # BLD AUTO: 213 K/UL (ref 150–400)
PMV BLD AUTO: 9.8 FL (ref 8.9–12.9)
POTASSIUM SERPL-SCNC: 3.6 MMOL/L (ref 3.5–5.1)
PROT SERPL-MCNC: 7.5 G/DL (ref 6.4–8.2)
RBC # BLD AUTO: 4.94 M/UL (ref 4.1–5.7)
SODIUM SERPL-SCNC: 138 MMOL/L (ref 136–145)
SPECIMEN HOLD: NORMAL
SPECIMEN HOLD: NORMAL
WBC # BLD AUTO: 13.4 K/UL (ref 4.1–11.1)

## 2024-03-29 PROCEDURE — 73610 X-RAY EXAM OF ANKLE: CPT

## 2024-03-29 PROCEDURE — 99284 EMERGENCY DEPT VISIT MOD MDM: CPT

## 2024-03-29 PROCEDURE — 80053 COMPREHEN METABOLIC PANEL: CPT

## 2024-03-29 PROCEDURE — 85025 COMPLETE CBC W/AUTO DIFF WBC: CPT

## 2024-03-29 ASSESSMENT — ENCOUNTER SYMPTOMS
RESPIRATORY NEGATIVE: 1
EYES NEGATIVE: 1
GASTROINTESTINAL NEGATIVE: 1
COLOR CHANGE: 1

## 2024-03-29 ASSESSMENT — PAIN SCALES - GENERAL: PAINLEVEL_OUTOF10: 0

## 2024-03-29 ASSESSMENT — LIFESTYLE VARIABLES
HOW OFTEN DO YOU HAVE A DRINK CONTAINING ALCOHOL: NEVER
HOW MANY STANDARD DRINKS CONTAINING ALCOHOL DO YOU HAVE ON A TYPICAL DAY: PATIENT DOES NOT DRINK

## 2024-03-29 ASSESSMENT — PAIN - FUNCTIONAL ASSESSMENT: PAIN_FUNCTIONAL_ASSESSMENT: 0-10

## 2024-03-29 NOTE — ED PROVIDER NOTES
[facility] EMERGENCY DEPT  EMERGENCY DEPARTMENT ENCOUNTER      Pt Name: Medhat Henao  MRN: [id number]  Birthdate 1945  Date of evaluation: 3/29/2024  Provider: Dawood Vallejo MD    CHIEF COMPLAINT       Chief Complaint   Patient presents with    Cellulitis         HISTORY OF PRESENT ILLNESS   (Location/Symptom, Timing/Onset, Context/Setting, Quality, Duration, Modifying Factors, Severity)  Note limiting factors.   79-year-old male with PMHx of A-fib on apixaban, and cellulitis of the left lower extremity presents to the emergency department complaining of redness swelling and tenderness over the left lower extremity for the 3 days.  Patient notes that he has been on both cephalexin and Bactrim for the last few days with no improvement in his symptoms.  He has no additional complaints at this time.  He denies any fevers                 The history is provided by the patient.         Review of External Medical Records:     Nursing Notes were reviewed.    REVIEW OF SYSTEMS    (2-9 systems for level 4, 10 or more for level 5)     Review of Systems   Constitutional: Negative.    HENT: Negative.     Eyes: Negative.    Respiratory: Negative.     Cardiovascular:  Positive for leg swelling.   Gastrointestinal: Negative.    Genitourinary: Negative.    Musculoskeletal: Negative.    Skin:  Positive for color change.   Neurological: Negative.    Psychiatric/Behavioral: Negative.         Except as noted above the remainder of the review of systems was reviewed and negative.       PAST MEDICAL HISTORY   No past medical history on file.      SURGICAL HISTORY     No past surgical history on file.      CURRENT MEDICATIONS       Previous Medications    APIXABAN (ELIQUIS) 5 MG TABS TABLET    Take 1 tablet by mouth 2 times daily    ASCORBIC ACID (VITAMIN C) 1000 MG TABLET    Take 1 tablet by mouth daily    ASPIRIN 81 MG CHEWABLE TABLET    Take 81 mg by mouth daily    ATORVASTATIN (LIPITOR) 20 MG TABLET    Take 1 tablet by mouth

## 2024-03-29 NOTE — PROGRESS NOTES
CM Consult Noted:   [facility] appt arranged for 8:30 AM tomorrow at [facility] for [place]. Pt and pt's wife provided with script and [facility] information, state no questions or concerns.     Order emailed to [email or web address] per [facility]'s request as their system is down.    LESLIE Khoury, CAROLANN   [facility] Care Manager

## 2024-03-29 NOTE — ED TRIAGE NOTES
Pt arrives co cellulitis to LLE. States the redness and swelling started approx 3 days ago  States he saw PCP yesterday and was placed on abx  Arrives co increasing pain, redness, and swelling

## 2024-03-29 NOTE — DISCHARGE INSTRUCTIONS
You were seen in the emergency department for leg swelling and redness.  The results of your tests were distant with cellulitis.  Please continue to take the antibiotics you have been prescribed, but also go to the infusion clinic for IV antibiotics as discussed.  Please follow-up with your PCP or return to the emergency department if you experience a worsening of symptoms or any new symptoms that are concerning to you.

## 2024-03-30 ENCOUNTER — HOSPITAL ENCOUNTER (OUTPATIENT)
Facility: HOSPITAL | Age: 79
Setting detail: INFUSION SERIES
Discharge: HOME | End: 2024-03-30
Payer: MEDICARE

## 2024-03-30 VITALS
SYSTOLIC BLOOD PRESSURE: 97 MMHG | HEART RATE: 69 BPM | DIASTOLIC BLOOD PRESSURE: 57 MMHG | RESPIRATION RATE: 18 BRPM | TEMPERATURE: 96.3 F

## 2024-03-30 DIAGNOSIS — L08.9 SOFT TISSUE INFECTION: Primary | ICD-10-CM

## 2024-03-30 PROCEDURE — 96365 THER/PROPH/DIAG IV INF INIT: CPT

## 2024-03-30 PROCEDURE — 2580000003 HC RX 258: Performed by: EMERGENCY MEDICINE

## 2024-03-30 PROCEDURE — 6360000002 HC RX W HCPCS: Performed by: EMERGENCY MEDICINE

## 2024-03-30 RX ORDER — ALBUTEROL SULFATE 90 UG/1
4 AEROSOL, METERED RESPIRATORY (INHALATION) PRN
OUTPATIENT
Start: 2024-03-30

## 2024-03-30 RX ORDER — DIPHENHYDRAMINE HYDROCHLORIDE 50 MG/ML
50 INJECTION INTRAMUSCULAR; INTRAVENOUS
Status: DISCONTINUED | OUTPATIENT
Start: 2024-03-30 | End: 2024-03-31 | Stop reason: HOSPADM

## 2024-03-30 RX ORDER — ACETAMINOPHEN 325 MG/1
650 TABLET ORAL
OUTPATIENT
Start: 2024-03-30

## 2024-03-30 RX ORDER — EPINEPHRINE 1 MG/ML
0.3 INJECTION, SOLUTION INTRAMUSCULAR; SUBCUTANEOUS PRN
OUTPATIENT
Start: 2024-03-30

## 2024-03-30 RX ORDER — SODIUM CHLORIDE 9 MG/ML
INJECTION, SOLUTION INTRAVENOUS CONTINUOUS
OUTPATIENT
Start: 2024-03-30

## 2024-03-30 RX ORDER — DIPHENHYDRAMINE HYDROCHLORIDE 50 MG/ML
50 INJECTION INTRAMUSCULAR; INTRAVENOUS
OUTPATIENT
Start: 2024-03-30

## 2024-03-30 RX ORDER — HEPARIN 100 UNIT/ML
500 SYRINGE INTRAVENOUS PRN
OUTPATIENT
Start: 2024-03-30

## 2024-03-30 RX ORDER — ACETAMINOPHEN 325 MG/1
650 TABLET ORAL
Status: DISCONTINUED | OUTPATIENT
Start: 2024-03-30 | End: 2024-03-31 | Stop reason: HOSPADM

## 2024-03-30 RX ORDER — SODIUM CHLORIDE 9 MG/ML
INJECTION, SOLUTION INTRAVENOUS CONTINUOUS
Status: DISCONTINUED | OUTPATIENT
Start: 2024-03-30 | End: 2024-03-31 | Stop reason: HOSPADM

## 2024-03-30 RX ORDER — SODIUM CHLORIDE 0.9 % (FLUSH) 0.9 %
5-40 SYRINGE (ML) INJECTION PRN
OUTPATIENT
Start: 2024-03-30

## 2024-03-30 RX ORDER — ONDANSETRON 2 MG/ML
8 INJECTION INTRAMUSCULAR; INTRAVENOUS
Status: DISCONTINUED | OUTPATIENT
Start: 2024-03-30 | End: 2024-03-31 | Stop reason: HOSPADM

## 2024-03-30 RX ORDER — SODIUM CHLORIDE 9 MG/ML
5-250 INJECTION, SOLUTION INTRAVENOUS PRN
OUTPATIENT
Start: 2024-03-30

## 2024-03-30 RX ORDER — ALBUTEROL SULFATE 90 UG/1
4 AEROSOL, METERED RESPIRATORY (INHALATION) PRN
Status: DISCONTINUED | OUTPATIENT
Start: 2024-03-30 | End: 2024-03-31 | Stop reason: HOSPADM

## 2024-03-30 RX ORDER — ONDANSETRON 2 MG/ML
8 INJECTION INTRAMUSCULAR; INTRAVENOUS
OUTPATIENT
Start: 2024-03-30

## 2024-03-30 RX ORDER — EPINEPHRINE 1 MG/ML
0.3 INJECTION, SOLUTION INTRAMUSCULAR; SUBCUTANEOUS PRN
Status: DISCONTINUED | OUTPATIENT
Start: 2024-03-30 | End: 2024-03-31 | Stop reason: HOSPADM

## 2024-03-30 RX ORDER — SODIUM CHLORIDE 0.9 % (FLUSH) 0.9 %
5-40 SYRINGE (ML) INJECTION PRN
Status: DISCONTINUED | OUTPATIENT
Start: 2024-03-30 | End: 2024-03-31 | Stop reason: HOSPADM

## 2024-03-30 RX ADMIN — DALBAVANCIN 1500 MG: 500 INJECTION, POWDER, FOR SOLUTION INTRAVENOUS at 08:34

## 2024-03-30 ASSESSMENT — PAIN DESCRIPTION - LOCATION: LOCATION: LEG

## 2024-03-30 ASSESSMENT — PAIN SCALES - GENERAL: PAINLEVEL_OUTOF10: 7

## 2024-03-30 ASSESSMENT — PAIN DESCRIPTION - ORIENTATION: ORIENTATION: LEFT

## 2024-03-30 NOTE — PROGRESS NOTES
[facility] Short Note                       Date: 2024    Name: Medhat Henao    MRN: [id number]         : 1945      Pt admit to [facility] for [facility] ambulatory in stable condition. Assessment completed and documented in flowsheets. No acute concerns at this time.    Mr. Henao's vitals were reviewed prior to and after treatment.   Patient Vitals for the past 12 hrs:   Temp Pulse Resp BP   24 0912 -- 69 -- (!) 97/57   24 0824 (!) 96.3 °F (35.7 °C) 83 18 106/61       Medications given: via PIV  Medications Administered         dalbavancin (DALVANCE) 1,500 mg in dextrose 5 % 500 mL IVPB Admin Date  2024 Action  New Bag Dose  1,500 mg Rate  999 mL/hr Route  IntraVENous Administered By  Inder Donis RN          PIV positive blood return noted by Mily Vaughn RN, flushed and removed prior to discharge.    Mr. Henao tolerated the infusion, and had no complaints.    [facility] and is aware of future appointments.     Future Appointments   Date Time Provider Department Center   2024  2:40 PM Swetha Gomes MD [facility]       INDER DONIS RN  2024  9:32 AM

## 2024-04-22 RX ORDER — METOPROLOL SUCCINATE 50 MG/1
50 TABLET, EXTENDED RELEASE ORAL DAILY
Qty: 90 TABLET | Refills: 3 | Status: SHIPPED | OUTPATIENT
Start: 2024-04-22

## 2024-04-22 NOTE — TELEPHONE ENCOUNTER
Requested Prescriptions     Signed Prescriptions Disp Refills    metoprolol succinate (TOPROL XL) 50 MG extended release tablet 90 tablet 3     Sig: Take 1 tablet by mouth once daily     Authorizing Provider: SWETHA GOMES     Ordering User: JEFFY MILLAN     Refill per verbal order Dr. Swetha Gomes.   Last visit:11/17/23  Next visit: 12/11/24

## 2024-09-19 RX ORDER — APIXABAN 5 MG/1
5 TABLET, FILM COATED ORAL 2 TIMES DAILY
Qty: 180 TABLET | Refills: 1 | Status: SHIPPED | OUTPATIENT
Start: 2024-09-19

## 2024-10-22 ENCOUNTER — TELEPHONE (OUTPATIENT)
Age: 79
End: 2024-10-22

## 2024-10-22 NOTE — TELEPHONE ENCOUNTER
Rescheduled appointment from December to March. Pt. Has a question regarding his heart monitor and receiving it in December.    Pt. Phone - [phone]

## 2024-10-23 NOTE — TELEPHONE ENCOUNTER
Returned patient call, ID verified using two patient identifiers.  Advised Mr. Henao that we will delay sending his monitor until February so that his monitor is finished right before his scheduled appointment.    Patient verbalized understanding and will call back with any other questions or concerns.    Future Appointments   Date Time Provider Department Center   3/19/2025 11:20 AM Swetha Gomes MD [facility]

## 2025-01-27 ENCOUNTER — TELEPHONE (OUTPATIENT)
Age: 80
End: 2025-01-27

## 2025-01-27 NOTE — TELEPHONE ENCOUNTER
----- Message from Coretta KIM sent at 11/17/2023 11:50 AM EST -----    ----- Message -----  From: Bev Lomas APRN - NP  Sent: 11/17/2023  11:45 AM EST  To: Coretta Savage    Please mail 2 week monitor in 11 months dx: AF    Thanks!

## 2025-01-27 NOTE — TELEPHONE ENCOUNTER
Patient called stated he was supposed to receive a heart monitor but did not get one, please research and connect with patient at [phone]

## 2025-01-27 NOTE — TELEPHONE ENCOUNTER
Enrolled with [employer] - Ordered and being shipped to patient's home address on file.  ETA within 5-7 Business Days.        Postpone Notification    This message has been postponed to Monday February 03, 2025 by Coretta Savage.  Message  Received: 3 months ago  Coretta Savage Jami; Kelsey Eastman         Previous Messages       ----- Message -----  From: Bev Lomas APRN - NP  Sent: 11/17/2023  11:45 AM EST  To: Coretta Savage    Please mail 2 week monitor in 11 months dx: AF    Thanks!

## 2025-01-27 NOTE — TELEPHONE ENCOUNTER
Spoke to pt & informed him that we had message to order his monitor next wk but I will go ahread & order it today.  Informed it will come by [employer] to verified address.  Explained it is a different company then last time.  Informed to wear for 14 days then send back [employer].  Verified pts f/u appt with Dr Gomes is at [facility] on 3/19/25  11:20 am.  Pt verified understanding.

## 2025-03-19 ENCOUNTER — OFFICE VISIT (OUTPATIENT)
Age: 80
End: 2025-03-19
Payer: MEDICARE

## 2025-03-19 VITALS
SYSTOLIC BLOOD PRESSURE: 130 MMHG | DIASTOLIC BLOOD PRESSURE: 78 MMHG | BODY MASS INDEX: 39.17 KG/M2 | HEIGHT: 75 IN | WEIGHT: 315 LBS | HEART RATE: 66 BPM

## 2025-03-19 DIAGNOSIS — Z86.79 S/P ABLATION OF ATRIAL FIBRILLATION: ICD-10-CM

## 2025-03-19 DIAGNOSIS — E66.01 SEVERE OBESITY (BMI 35.0-35.9 WITH COMORBIDITY): ICD-10-CM

## 2025-03-19 DIAGNOSIS — I48.11 LONGSTANDING PERSISTENT ATRIAL FIBRILLATION (HCC): Primary | ICD-10-CM

## 2025-03-19 DIAGNOSIS — G47.33 OSA ON CPAP: ICD-10-CM

## 2025-03-19 DIAGNOSIS — I11.0: ICD-10-CM

## 2025-03-19 DIAGNOSIS — Z79.01 ANTICOAGULATION ADEQUATE: ICD-10-CM

## 2025-03-19 DIAGNOSIS — G45.9 TIA (TRANSIENT ISCHEMIC ATTACK): ICD-10-CM

## 2025-03-19 DIAGNOSIS — Z98.890 S/P ABLATION OF ATRIAL FIBRILLATION: ICD-10-CM

## 2025-03-19 PROCEDURE — 1160F RVW MEDS BY RX/DR IN RCRD: CPT | Performed by: INTERNAL MEDICINE

## 2025-03-19 PROCEDURE — 93005 ELECTROCARDIOGRAM TRACING: CPT | Performed by: INTERNAL MEDICINE

## 2025-03-19 PROCEDURE — G8417 CALC BMI ABV UP PARAM F/U: HCPCS | Performed by: INTERNAL MEDICINE

## 2025-03-19 PROCEDURE — 1126F AMNT PAIN NOTED NONE PRSNT: CPT | Performed by: INTERNAL MEDICINE

## 2025-03-19 PROCEDURE — 3078F DIAST BP <80 MM HG: CPT | Performed by: INTERNAL MEDICINE

## 2025-03-19 PROCEDURE — G8427 DOCREV CUR MEDS BY ELIG CLIN: HCPCS | Performed by: INTERNAL MEDICINE

## 2025-03-19 PROCEDURE — 1159F MED LIST DOCD IN RCRD: CPT | Performed by: INTERNAL MEDICINE

## 2025-03-19 PROCEDURE — 4004F PT TOBACCO SCREEN RCVD TLK: CPT | Performed by: INTERNAL MEDICINE

## 2025-03-19 PROCEDURE — 99214 OFFICE O/P EST MOD 30 MIN: CPT | Performed by: INTERNAL MEDICINE

## 2025-03-19 PROCEDURE — 93010 ELECTROCARDIOGRAM REPORT: CPT | Performed by: INTERNAL MEDICINE

## 2025-03-19 PROCEDURE — G2211 COMPLEX E/M VISIT ADD ON: HCPCS | Performed by: INTERNAL MEDICINE

## 2025-03-19 PROCEDURE — 3075F SYST BP GE 130 - 139MM HG: CPT | Performed by: INTERNAL MEDICINE

## 2025-03-19 PROCEDURE — 1123F ACP DISCUSS/DSCN MKR DOCD: CPT | Performed by: INTERNAL MEDICINE

## 2025-03-19 NOTE — PATIENT INSTRUCTIONS
[facility]  Emphasized CPAP compliance  Goal weight loss of 10%, 30 lbs   Goal blood pressure less than 130/90 mmHg    FU with Dr. Gomes in 1 year with a 1 week monitor

## 2025-03-19 NOTE — PROGRESS NOTES
Cardiac Electrophysiology Office Follow-up    NAME: Medhat Henao   :  1945  MRM:  [id number]    Date:  3/19/2025         Assessment and Plan:     1. Longstanding persistent atrial fibrillation (HCC)  -     EKG 12 Lead  2. 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 TIA (transient ischemic attack)  4. NAHUM on CPAP  5. Anticoagulation adequate  6. S/P ablation of atrial fibrillation  7. Severe obesity (BMI 35.0-35.9 with comorbidity)       Longstanding persistent atrial fibrillation  Symptomatic with history of heart failure. Status post PVI, CTI, posterior wall isolation ablation 2021. Has done well status post ablation without any recurrent atrial fibrillation or palpitations. Remains in sinus rhythm today on EKG.  - I'm pleased to hear how well the patient has done post ablation. We spoke in great detail regarding the importance of multidisciplinary management of AFib and the role of risk factors modification in preventing recurrent AF including focusing on diet, weight loss, control of blood pressure, glycemic control, NAHUM diagnosis and treatment, and medication compliance.  - no longer on Amiodarone  -CPAP compliance  -Weight loss goal ~30 lbs  -BP goal <130/90 mmHg  - Two week monitor 2025 showed no evidence of AF  -Continue metoprolol for management of blood pressure  - Continue Eliquis for thromboembolic prophylaxis  -FU with Dr. Gomes in one year with 1 week monitor prior     Anticoagulation  - Continue Eliquis 5 mg BID for CVA prophylaxis   - Denies of any bleeding issues. Know to contact clinic with any bleeding side effects (BRBPR, melena, hemoptysis, hematuria).     Severe obesity  I have discussed with the patient the need to exercise and lose weight. Encouraged increased physical activity as able and reduced caloric intake.  - Offered weight loss clinic referral but patient decline  - Recommend goal weight loss of 31 pounds

## 2025-03-19 NOTE — PROGRESS NOTES
had concerns including Atrial Fibrillation (PAF), Congestive Heart Failure, and Other (TIA).    Vitals:    03/19/25 1100   BP: 130/78   BP Site: Left Upper Arm   Patient Position: Sitting   BP Cuff Size: Large Adult   Pulse: 66   Weight: (!) 143.3 kg (316 lb)   Height: 1.905 m (6' 3\")   PF: 98 L/min        Chest pain No    Refills eliquis         1. Have you been to the ER, urgent care clinic since your last visit? No       Hospitalized since your last visit? No       Where?        Date?

## 2025-04-10 RX ORDER — METOPROLOL SUCCINATE 50 MG/1
50 TABLET, EXTENDED RELEASE ORAL DAILY
Qty: 90 TABLET | Refills: 3 | Status: SHIPPED | OUTPATIENT
Start: 2025-04-10